# Patient Record
Sex: MALE | Race: WHITE | Employment: OTHER | URBAN - METROPOLITAN AREA
[De-identification: names, ages, dates, MRNs, and addresses within clinical notes are randomized per-mention and may not be internally consistent; named-entity substitution may affect disease eponyms.]

---

## 2017-03-03 ENCOUNTER — OFFICE VISIT (OUTPATIENT)
Dept: AUDIOLOGY | Facility: CLINIC | Age: 67
End: 2017-03-03

## 2017-03-03 DIAGNOSIS — H90.3 SENSORY HEARING LOSS, BILATERAL: Primary | ICD-10-CM

## 2017-09-13 ENCOUNTER — RECORDS - HEALTHEAST (OUTPATIENT)
Dept: LAB | Facility: CLINIC | Age: 67
End: 2017-09-13

## 2017-09-14 LAB
CHOLEST SERPL-MCNC: 161 MG/DL
FASTING STATUS PATIENT QL REPORTED: NORMAL
HDLC SERPL-MCNC: 67 MG/DL
LDLC SERPL CALC-MCNC: 78 MG/DL
PSA SERPL-MCNC: 1.1 NG/ML (ref 0–4.5)
TRIGL SERPL-MCNC: 82 MG/DL

## 2017-11-19 ENCOUNTER — COMMUNICATION - HEALTHEAST (OUTPATIENT)
Dept: SCHEDULING | Facility: CLINIC | Age: 67
End: 2017-11-19

## 2017-11-29 ENCOUNTER — TELEPHONE (OUTPATIENT)
Dept: AUDIOLOGY | Facility: CLINIC | Age: 67
End: 2017-11-29

## 2017-11-30 NOTE — TELEPHONE ENCOUNTER
Tyrone Cohn was seen at the [facility] on 11/29/17 for hearing aid services.  His hearing aids were observed to have wax occlusion in the domes and the domes had detached from their  attachment points.   filters and domes were replaced bilaterally.  The left hearing aid was found to be functioning well but the right device was non-functional.  A new  was placed from clinic stock (in-warranty) and the hearing aid returned to normal function.  A new set of rechargeable size 312 batteries are being ordered for Mr. Cohn as he reports the current ones only provide ~6 hours of usage on a full charge.  The batteries will be billed to his insurance and mailed to his home.  He will return to the clinic as needed.

## 2017-12-06 ENCOUNTER — OFFICE VISIT (OUTPATIENT)
Dept: AUDIOLOGY | Facility: CLINIC | Age: 67
End: 2017-12-06

## 2017-12-06 DIAGNOSIS — H90.3 SENSORY HEARING LOSS, BILATERAL: Primary | ICD-10-CM

## 2018-03-27 ENCOUNTER — RECORDS - HEALTHEAST (OUTPATIENT)
Dept: ADMINISTRATIVE | Facility: OTHER | Age: 68
End: 2018-03-27

## 2018-03-28 ENCOUNTER — HOSPITAL ENCOUNTER (OUTPATIENT)
Dept: RESPIRATORY THERAPY | Facility: CLINIC | Age: 68
Discharge: HOME | End: 2018-03-28

## 2018-03-28 DIAGNOSIS — R06.02 SOB (SHORTNESS OF BREATH): ICD-10-CM

## 2018-03-28 LAB — HGB BLD-MCNC: 15 G/DL (ref 14–18)

## 2018-04-23 ENCOUNTER — RECORDS - HEALTHEAST (OUTPATIENT)
Dept: LAB | Facility: CLINIC | Age: 68
End: 2018-04-23

## 2018-04-23 LAB
ALBUMIN SERPL-MCNC: 3.7 G/DL (ref 3.5–5)
ALP SERPL-CCNC: 74 U/L (ref 45–120)
ALT SERPL W P-5'-P-CCNC: 19 U/L (ref 0–45)
ANION GAP SERPL CALCULATED.3IONS-SCNC: 10 MMOL/L (ref 5–18)
AST SERPL W P-5'-P-CCNC: 23 U/L (ref 0–40)
BILIRUB SERPL-MCNC: 1 MG/DL (ref 0–1)
BUN SERPL-MCNC: 18 MG/DL (ref 8–22)
CALCIUM SERPL-MCNC: 9.2 MG/DL (ref 8.5–10.5)
CHLORIDE BLD-SCNC: 106 MMOL/L (ref 98–107)
CO2 SERPL-SCNC: 24 MMOL/L (ref 22–31)
CREAT SERPL-MCNC: 0.89 MG/DL (ref 0.7–1.3)
GFR SERPL CREATININE-BSD FRML MDRD: >60 ML/MIN/1.73M2
GLUCOSE BLD-MCNC: 82 MG/DL (ref 70–125)
POTASSIUM BLD-SCNC: 4.6 MMOL/L (ref 3.5–5)
PROT SERPL-MCNC: 6.3 G/DL (ref 6–8)
SODIUM SERPL-SCNC: 140 MMOL/L (ref 136–145)

## 2018-06-25 ENCOUNTER — TELEPHONE (OUTPATIENT)
Dept: AUDIOLOGY | Facility: CLINIC | Age: 68
End: 2018-06-25

## 2018-06-25 NOTE — TELEPHONE ENCOUNTER
Health Call Center    Phone Message    May a detailed message be left on voicemail: yes    Reason for Call: Other: Patient is calling about getting batteries maild to him, he stated he called and left a voice message 2 weeks ago with no response?  Please call patient to follow up.      Action Taken: Message routed to:  [facility] AUDIOLOGY

## 2018-06-27 ENCOUNTER — OFFICE VISIT (OUTPATIENT)
Dept: AUDIOLOGY | Facility: CLINIC | Age: 68
End: 2018-06-27

## 2018-06-27 DIAGNOSIS — H90.3 SENSORY HEARING LOSS, BILATERAL: Primary | ICD-10-CM

## 2018-08-27 ENCOUNTER — OFFICE VISIT (OUTPATIENT)
Dept: AUDIOLOGY | Facility: CLINIC | Age: 68
End: 2018-08-27
Payer: OTHER MISCELLANEOUS

## 2018-08-27 DIAGNOSIS — H90.3 SENSORINEURAL HEARING LOSS, BILATERAL: Primary | ICD-10-CM

## 2018-08-27 NOTE — MR AVS SNAPSHOT
After Visit Summary   8/27/2018    Tyrone Cohn    MRN: [id number]           Patient Information     Date Of Birth          1950        Visit Information        Provider Department      8/27/2018 11:00 AM Vivien Francois AuD M [facility] Audiology        Today's Diagnoses     Sensorineural hearing loss, bilateral    -  1       Follow-ups after your visit        Your next 10 appointments already scheduled     Aug 29, 2018 11:00 AM CDT   Programmable Hearing Aid Check with Chloe Talamantes [facility] Audiology ([facility])    [address] [place] [id number] [phone]              Who to contact     Please call your clinic at [phone] to:    Ask questions about your health    Make or cancel appointments    Discuss your medicines    Learn about your test results    Speak to your doctor            Additional Information About Your Visit        Care EveryWhere ID     This is your Care EveryWhere ID. This could be used by other organizations to access your [place] medical records  [id number]         Blood Pressure from Last 3 Encounters:   No data found for BP    Weight from Last 3 Encounters:   No data found for Wt              We Performed the Following     AUDIOGRAM/TYMPANOGRAM - INTERFACE     [facility] Recog ([id number])     Tymps / Reflex   ([id number])        Primary Care Provider    None Specified       No primary provider on file.        Equal Access to Services     [facility] EDEN : Hadii suzanne yates hadasho Soomaali, waaxda luqadaha, qaybta kaalmada adeegyada, waxay betty serna. So [facility] [phone].    ATENCIÓN: Si habla español, tiene a loera disposición servicios gratjewellos de asistencia lingüística. Llame al [phone].    We comply with applicable federal civil rights laws and [place] laws. We do not discriminate on the basis of race, color, national origin, age, disability, sex, sexual  orientation, or gender identity.            Thank you!     Thank you for choosing [facility] AUDIOLOGY  for your care. Our goal is always to provide you with excellent care. Hearing back from our patients is one way we can continue to improve our services. Please take a few minutes to complete the written survey that you may receive in the mail after your visit with us. Thank you!             Your Updated Medication List - Protect others around you: Learn how to safely use, store and throw away your medicines at www.disposemymeds.org.      Notice  As of 8/27/2018  2:16 PM    You have not been prescribed any medications.

## 2018-08-27 NOTE — PROGRESS NOTES
AUDIOLOGY REPORT    SUBJECTIVE:  Tyrone Cohn is a 68 year old male who was seen in the Audiology Clinic at the [facility] for audiologic evaluation. The patient has been seen previously in this clinic on 04/07/2015 for assessment and results indicated normal sloping to severe sensorineural hearing loss bilaterally. The patient reports a slight decline in hearing and bilateral tinnitus. The patient denies otalgia, pressure and dizziness.  The patient was fit with binaural Signia Pure 7bx FER hearing aids on 09/02/2015. He reports that the hearing aids are not holding charge.     OBJECTIVE:  Otoscopic exam indicates ears are clear of cerumen bilaterally     Pure Tone Thresholds assessed using conventional audiometry with good  reliability from 250-8000 Hz bilaterally using circumaural headphones     RIGHT:  Normal sloping to severe sensorineural hearing loss    LEFT:    Normal sloping to severe sensorineural hearing loss    Tympanogram:    RIGHT: normal eardrum mobility    LEFT:   normal eardrum mobility    Reflexes (reported by stimulus ear):  RIGHT: Ipsilateral is present at normal levels  RIGHT: Contralateral is present at normal levels  LEFT:   Ipsilateral is present at normal levels  LEFT:   Contralateral is present at normal levels      Speech Reception Threshold:    RIGHT: 20 dB HL    LEFT:   20 dB HL  Word Recognition Score:     RIGHT: 88% at 60 dB HL using NU-6 recorded word list.    LEFT:   88% at 60 dB HL using NU-6 recorded word list.      ASSESSMENT:   Compared to patient's previous audiogram dated 04/07/2015, hearing has decreased 15 dB from 500-1000 Hz in the right ear, and 10 dB from 2-8 kHz in each ear. Today s results were discussed with the patient in detail. The patient's hearing aids are out of warranty in October 2018. The hearing aids and  are going to be sent in for repair due to the short battery life.    PLAN:  The patient will be called when his  hearing aid and  return from repair. It is recommended that the patient schedule an appointment for programming of his hearing aids.  Please call this clinic with questions regarding these results or recommendations.        Bambi Fowler M.A.  Audiology Doctoral Extern  [place] #[id number]      I was present with the patient for the entire Audiology appointment including all procedures/testing performed by the AuD student, and agree with the student s assessment and plan as documented.      Seamus Thompson  Licensed Audiologist  [place] License #[id number]

## 2018-08-31 ENCOUNTER — MEDICAL CORRESPONDENCE (OUTPATIENT)
Dept: HEALTH INFORMATION MANAGEMENT | Facility: CLINIC | Age: 68
End: 2018-08-31

## 2018-09-18 ENCOUNTER — TELEPHONE (OUTPATIENT)
Dept: AUDIOLOGY | Facility: CLINIC | Age: 68
End: 2018-09-18

## 2018-09-18 NOTE — TELEPHONE ENCOUNTER
[facility] Call Center    Phone Message    May a detailed message be left on voicemail: yes    Reason for Call: Other: Patient would like call to know when he'd be getting his hearing aids back. Patient reports he was to have them expedited and it's been 3 weeks. Please follow-up.      Action Taken: Message routed to:  [facility] soo

## 2018-09-18 NOTE — TELEPHONE ENCOUNTER
Left message for patient that hearing aids are back from repair and available to be picked up from the Audiology Clinic.  Left a message on 9/10/18 with the same information.

## 2018-09-21 ENCOUNTER — RECORDS - HEALTHEAST (OUTPATIENT)
Dept: LAB | Facility: CLINIC | Age: 68
End: 2018-09-21

## 2018-09-21 LAB — HIV 1+2 AB+HIV1 P24 AG SERPL QL IA: NEGATIVE

## 2018-09-22 LAB — T PALLIDUM AB SER QL: NEGATIVE

## 2018-09-24 LAB
C TRACH DNA SPEC QL PROBE+SIG AMP: NEGATIVE
HSV1 IGG SERPL QL IA: POSITIVE
HSV2 IGG SERPL QL IA: NEGATIVE
N GONORRHOEA DNA SPEC QL NAA+PROBE: NEGATIVE

## 2018-10-01 ENCOUNTER — OFFICE VISIT (OUTPATIENT)
Dept: AUDIOLOGY | Facility: CLINIC | Age: 68
End: 2018-10-01
Payer: OTHER MISCELLANEOUS

## 2018-10-01 DIAGNOSIS — H90.3 SENSORINEURAL HEARING LOSS, BILATERAL: Primary | ICD-10-CM

## 2018-10-02 NOTE — PROGRESS NOTES
AUDIOLOGY REPORT    SUBJECTIVE:  Tyrone Cohn is a 68 year old male who was seen in Audiology at the [facility] for a hearing aid check. The patient has been seen previously in this clinic on 8/28/18 for assessment and results indicated normal sloping to severe sensorineural hearing loss bilaterally. The patient was fit with binaural Signia Pure 7bx FER hearing aids on 09/02/2015. The hearing aids were recently sent in for repair and he is here to pick them up and to have the hearing aids turned up.    OBJECTIVE:  Overall gain was increased by 3 dB in both hearing aids which Tyrone reported sounded good. He will try the new settings and if he needs more adjustments he will return to the clinic.    ASSESSMENT:   Repaired hearing aids and  picked up. Overall gain increased by 3.     PLAN:  Tyrone will return for follow-up as needed, or at least every 6-9 months for cleaning and assessment of hearing aid. Please call this clinic with any questions regarding today s appointment.    Seamus Thompson, [facility]  Licensed Audiologist  [place] License #[id number]

## 2018-11-15 ENCOUNTER — RECORDS - HEALTHEAST (OUTPATIENT)
Dept: LAB | Facility: CLINIC | Age: 68
End: 2018-11-15

## 2018-11-15 LAB
CHOLEST SERPL-MCNC: 193 MG/DL
FASTING STATUS PATIENT QL REPORTED: NO
HDLC SERPL-MCNC: 80 MG/DL
LDLC SERPL CALC-MCNC: 103 MG/DL
PSA SERPL-MCNC: 1 NG/ML (ref 0–4.5)
TRIGL SERPL-MCNC: 50 MG/DL

## 2019-04-15 ENCOUNTER — RECORDS - HEALTHEAST (OUTPATIENT)
Dept: ADMINISTRATIVE | Facility: OTHER | Age: 69
End: 2019-04-15

## 2019-05-03 ENCOUNTER — HOSPITAL ENCOUNTER (OUTPATIENT)
Dept: NUCLEAR MEDICINE | Facility: CLINIC | Age: 69
Discharge: HOME | End: 2019-05-03

## 2019-05-03 ENCOUNTER — HOSPITAL ENCOUNTER (OUTPATIENT)
Dept: CARDIOLOGY | Facility: CLINIC | Age: 69
Discharge: HOME | End: 2019-05-03

## 2019-05-03 DIAGNOSIS — R07.9 CHEST PAIN, UNSPECIFIED TYPE: ICD-10-CM

## 2019-05-03 LAB
CV STRESS CURRENT BP HE: NORMAL
CV STRESS CURRENT HR HE: 104
CV STRESS CURRENT HR HE: 107
CV STRESS CURRENT HR HE: 113
CV STRESS CURRENT HR HE: 113
CV STRESS CURRENT HR HE: 117
CV STRESS CURRENT HR HE: 119
CV STRESS CURRENT HR HE: 120
CV STRESS CURRENT HR HE: 122
CV STRESS CURRENT HR HE: 129
CV STRESS CURRENT HR HE: 129
CV STRESS CURRENT HR HE: 133
CV STRESS CURRENT HR HE: 137
CV STRESS CURRENT HR HE: 141
CV STRESS CURRENT HR HE: 148
CV STRESS CURRENT HR HE: 149
CV STRESS CURRENT HR HE: 152
CV STRESS CURRENT HR HE: 155
CV STRESS CURRENT HR HE: 54
CV STRESS CURRENT HR HE: 56
CV STRESS CURRENT HR HE: 60
CV STRESS CURRENT HR HE: 75
CV STRESS CURRENT HR HE: 82
CV STRESS CURRENT HR HE: 83
CV STRESS CURRENT HR HE: 83
CV STRESS CURRENT HR HE: 84
CV STRESS CURRENT HR HE: 84
CV STRESS CURRENT HR HE: 86
CV STRESS CURRENT HR HE: 87
CV STRESS CURRENT HR HE: 89
CV STRESS CURRENT HR HE: 90
CV STRESS CURRENT HR HE: 93
CV STRESS CURRENT HR HE: 95
CV STRESS CURRENT HR HE: 99
CV STRESS CURRENT HR HE: 99
CV STRESS DEVIATION TIME HE: NORMAL
CV STRESS ECHO PERCENT HR HE: NORMAL
CV STRESS EXERCISE STAGE HE: NORMAL
CV STRESS EXERCISE STAGE REACHED HE: NORMAL
CV STRESS FINAL RESTING BP HE: NORMAL
CV STRESS FINAL RESTING HR HE: 84
CV STRESS MAX HR HE: 156
CV STRESS MAX TREADMILL GRADE HE: 16
CV STRESS MAX TREADMILL SPEED HE: 4.2
CV STRESS PEAK DIA BP HE: NORMAL
CV STRESS PEAK SYS BP HE: NORMAL
CV STRESS PHASE HE: NORMAL
CV STRESS PROTOCOL HE: NORMAL
CV STRESS RESTING PT POSITION HE: NORMAL
CV STRESS ST DEVIATION AMOUNT HE: NORMAL
CV STRESS ST DEVIATION ELEVATION HE: NORMAL
CV STRESS ST EVELATION AMOUNT HE: NORMAL
CV STRESS TEST TYPE HE: NORMAL
CV STRESS TOTAL STAGE TIME MIN 1 HE: NORMAL
NUC STRESS EJECTION FRACTION: 66 %
STRESS ECHO BASELINE BP: NORMAL
STRESS ECHO BASELINE HR: 56
STRESS ECHO CALCULATED PERCENT HR: 102 %
STRESS ECHO LAST STRESS BP: NORMAL
STRESS ECHO LAST STRESS HR: 155
STRESS ECHO POST ESTIMATED WORKLOAD: 12.1
STRESS ECHO POST EXERCISE DUR MIN: 10
STRESS ECHO POST EXERCISE DUR SEC: 20
STRESS ECHO TARGET HR: 130

## 2019-07-17 ENCOUNTER — RECORDS - HEALTHEAST (OUTPATIENT)
Dept: LAB | Facility: CLINIC | Age: 69
End: 2019-07-17

## 2019-07-17 LAB — C REACTIVE PROTEIN LHE: 0.1 MG/DL (ref 0–0.8)

## 2019-07-18 LAB
ANA SER QL: 0.2 U
B BURGDOR IGG+IGM SER QL: 2.39 INDEX VALUE
CCP AB SER IA-ACNC: <0.5 U/ML

## 2019-07-19 LAB
B BURGDOR AB SER-IMP: NORMAL
LYME AB IGG BAND(S): NORMAL
LYME AB IGM BAND(S): NORMAL
LYME IGG BLOT: NEGATIVE
LYME IGM BLOT: NEGATIVE

## 2019-08-12 ENCOUNTER — OFFICE VISIT (OUTPATIENT)
Dept: AUDIOLOGY | Facility: CLINIC | Age: 69
End: 2019-08-12
Payer: OTHER MISCELLANEOUS

## 2019-08-12 DIAGNOSIS — H90.3 SENSORY HEARING LOSS, BILATERAL: Primary | ICD-10-CM

## 2019-08-29 ENCOUNTER — TELEPHONE (OUTPATIENT)
Dept: AUDIOLOGY | Facility: CLINIC | Age: 69
End: 2019-08-29

## 2019-08-29 NOTE — TELEPHONE ENCOUNTER
Left a message for the patient stating the he could use over-the-ear headphones that are large enough to fit over the  of the hearing aid in his ear and the body of the hearing aid that sits on top of his ear. If he is using earbuds, it will work best to just take the hearing aids out.    Storm Leos, [facility]  Licensed Audiologist  [place] #[id number]      [facility] Call Center    Phone Message    May a detailed message be left on voicemail: yes    Reason for Call: Other: Patient is calling in and has some questions about headsets that work with his hearing aids. 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 Please follow up with the patient asap. Thank you.     Action Taken: Message routed to:  [facility] audiology

## 2019-11-26 ENCOUNTER — RECORDS - HEALTHEAST (OUTPATIENT)
Dept: LAB | Facility: CLINIC | Age: 69
End: 2019-11-26

## 2019-11-27 LAB
ANION GAP SERPL CALCULATED.3IONS-SCNC: 10 MMOL/L (ref 5–18)
BUN SERPL-MCNC: 21 MG/DL (ref 8–22)
CALCIUM SERPL-MCNC: 9.4 MG/DL (ref 8.5–10.5)
CHLORIDE BLD-SCNC: 105 MMOL/L (ref 98–107)
CHOLEST SERPL-MCNC: 200 MG/DL
CO2 SERPL-SCNC: 26 MMOL/L (ref 22–31)
CREAT SERPL-MCNC: 0.95 MG/DL (ref 0.7–1.3)
FASTING STATUS PATIENT QL REPORTED: ABNORMAL
GFR SERPL CREATININE-BSD FRML MDRD: >60 ML/MIN/1.73M2
GLUCOSE BLD-MCNC: 85 MG/DL (ref 70–125)
HDLC SERPL-MCNC: 72 MG/DL
LDLC SERPL CALC-MCNC: 104 MG/DL
POTASSIUM BLD-SCNC: 4.3 MMOL/L (ref 3.5–5)
PSA SERPL-MCNC: 1.1 NG/ML (ref 0–4.5)
SODIUM SERPL-SCNC: 141 MMOL/L (ref 136–145)
TRIGL SERPL-MCNC: 120 MG/DL

## 2020-02-05 ENCOUNTER — OFFICE VISIT (OUTPATIENT)
Dept: AUDIOLOGY | Facility: CLINIC | Age: 70
End: 2020-02-05
Payer: OTHER MISCELLANEOUS

## 2020-02-05 DIAGNOSIS — H90.3 SENSORINEURAL HEARING LOSS, BILATERAL: Primary | ICD-10-CM

## 2020-02-05 NOTE — PROGRESS NOTES
AUDIOLOGY REPORT    SUBJECTIVE: Tyrone Cohn is a 70 year old male who was seen in the Audiology Clinic at [facility] for audiologic evaluation and hearing aid check. The patient has been seen previously in this clinic on 8/27/2018 for assessment, and results indicated normal hearing sloping to moderately severe sensorineural hearing loss for the right ear and normal hearing sloping to severe sensorineural hearing loss for the left ear. He reports a possible decrease in hearing for both ears. He notes increased tinnitus bilaterally. He denies pain, drainage, dizziness, or a history of ear surgeries.  He was fit with bilateral Siemens Pure 7bx hearing aids on 9/16/2015. He notes the rechargeable batteries have not been holding a charge well.    OBJECTIVE:  Abuse Screening:  Do you feel unsafe at home or work/school? No  Do you feel threatened by someone? No  Does anyone try to keep you from having contact with others, or doing things outside of your home? No  Physical signs of abuse present? No     Fall Risk Screening:  Have you fallen two or more times in the past year? No  Have you fallen and had an injury in the past year? No    Otoscopic exam indicated ears are clear of cerumen bilaterally     Pure Tone Thresholds assessed using conventional audiometry with good reliability from 250-8000 Hz bilaterally using insert earphones and circumaural headphones     RIGHT:  Normal hearing sloping to moderately severe sensorineural hearing loss    LEFT:    Normal hearing sloping to severe sensorineural hearing loss    Tympanogram:    RIGHT: Normal eardrum mobility    LEFT:   Normal eardrum mobility    Reflexes (reported by stimulus ear):    RIGHT: Ipsilateral is present at normal levels    RIGHT: Contralateral is present at normal levels    LEFT:   Ipsilateral is present at normal levels    LEFT:   Contralateral is present at normal levels    Speech Reception Threshold:     RIGHT: 20 dB HL    LEFT:   20 dB HL    Word Recognition Score:     RIGHT: 100% at 80 dB HL using NU-6 recorded word list    LEFT:   100% at 80 dB HL using NU-6 recorded word list    Both hearing aids were deep cleaned. A listening check revealed the right hearing aid sounds weak and the left hearing aid sounds crisp and clear. An electroacoustic analysis was performed and showed the right hearing aid is weak and has high distortion and the left hearing aid is functioning up to specifications. It was recommended that the right hearing aid be sent in for an out-of-warranty repair. Explained that we could get new rechargeable batteries for both hearing aids as well. The patient stated that he is interested in pursuing new hearing aids through his insurance. He is worried that his insurance will not cover new hearing aids if the repair is done/he gets new rechargeable batteries.     ASSESSMENT: Bilateral sensorineural hearing loss. Compared to the patient's previous audiogram dated 8/27/2018, hearing has remained stable for both ears. Today s results were discussed with the patient in detail.     A hearing aid check was completed. The right hearing aid is weak and has high distortion.      PLAN: Tyrone will return for a hearing aid consultation to discuss new technology. He will let the clinic know if he would like to send the right hearing aid in for repair and get new rechargeable batteries. Please call with questions regarding these results or recommendations.      Storm Leos, [facility]  Licensed Audiologist  [place] #[id number]

## 2020-02-07 ENCOUNTER — OFFICE VISIT (OUTPATIENT)
Dept: AUDIOLOGY | Facility: CLINIC | Age: 70
End: 2020-02-07
Payer: OTHER MISCELLANEOUS

## 2020-02-07 DIAGNOSIS — H90.3 SENSORINEURAL HEARING LOSS, BILATERAL: Primary | ICD-10-CM

## 2020-02-07 NOTE — PROGRESS NOTES
AUDIOLOGY REPORT    SUBJECTIVE:   Tyrone Cohn is a 70 year old male was seen in Audiology at the [facility] on 2/07/20 to discuss concerns with hearing and functional communication difficulties. Tyrone has been seen previously on 2/5/2020, and results revealed a normal sloping to moderately-severe sensorineural hearing loss in the right ear and a normal sloping to profound sensorineural hearing loss in the left.  Tyrone notes difficulty with communication in a variety of listening situations. He is a long term hearing aid user and was last fit with Siemens Pure 7bx hearing aids 9/16/15.    OBJECTIVE:  Patient is a hearing aid candidate. Patient would like to move forward with a hearing aid evaluation today. Therefore, the patient was presented with different options for amplification to help aid in communication. Discussed styles, levels of technology and monaural vs. binaural fitting.     The hearing aid(s) mutually chosen were:  Binaural: Signia Pure 7Nx Charge & Go  COLOR: Dark champagne  BATTERY SIZE: N/A  EARMOLD/TIPS: 8mm open  CANAL/ LENGTH: 2 standard    Patient would also like to order a Streamline ramy    ASSESSMENT:     Reviewed purchase information and warranty information with patient. The 45 day trial period was explained to patient. The patient was given a copy of the [place] Department of Health consumer brochure on purchasing hearing instruments. Patient risk factors have been provided to the patient in writing prior to the sale of the hearing aid per FDA regulation. The risk factors are also available in the User Instructional Booklet to be presented on the day of the hearing aid fitting. Hearing aid(s) ordered. Hearing aid evaluation completed.    PLAN:   Tyrone is scheduled to return in 2-3 weeks for a hearing aid fitting and programming. Purchase agreement will be completed on that date. Please contact this clinic with any questions or  concerns.      Seamus Thompson, [facility]  Licensed Audiologist  [place] License #[id number]

## 2020-03-10 ENCOUNTER — OFFICE VISIT (OUTPATIENT)
Dept: AUDIOLOGY | Facility: CLINIC | Age: 70
End: 2020-03-10
Payer: OTHER MISCELLANEOUS

## 2020-03-10 DIAGNOSIS — H90.3 SENSORINEURAL HEARING LOSS, BILATERAL: Primary | ICD-10-CM

## 2020-03-10 NOTE — PROGRESS NOTES
"AUDIOLOGY REPORT    SUBJECTIVE: Tyrone Cohn is a 70 year old male who was seen in the Audiology Clinic at the [facility] for a fitting of binaural Signia Pure 7Nx Charge and Go  in-the-ear hearing aids. Previous results have revealed a normal sloping to moderately-severe sensorineural hearing loss in the right ear and a normal sloping to profound sensorineural hearing loss in the left.     OBJECTIVE: The hearing aid conformity evaluation was completed.The hearing aids were placed and they provided a good fit. Real-ear-probe-microphone measurements were completed on the iSkoot system and were a good match to NAL-NL1 target with soft sounds audible, moderate sounds comfortable, and loud sounds below discomfort. UCLs are verified through maximum power output measures and demonstrate appropriate limiting of loud inputs. Tyrone was oriented to proper hearing aid use, care, cleaning (no water, dry brush), batteries (size rechargeable, insertion/removal, toxicity, low-battery signal), aid insertion/removal, user booklet, warranty information, storage cases, and other hearing aid details. The patient confirmed understanding of hearing aid use and care, and showed proper insertion of hearing aid and batteries while in the office today.Tyrone reported good volume and sound quality today. He reports a static like noise in his left ear with \"sh\". A more closed dome was tried in the left ear, however patient reported he felt more plugged up. The dome was switched back to an open dome and gain was decreased on the left using the fitting assistant. Patient reported the static went away following these changes.     Hearing aids were programmed as follows:  Program 1:Universal   At this time, patient would like to keep hearing aids automatic with just the Universal program, but volume control was activated (Right to raise the volume in both and left to lower the volume in both).    EAR(S) FIT: " Bilateral  HEARING AID MODEL NAME: Noemy Pure 7 Nx Charge and Go  HEARING AID STYLE: -in-the-ear behind-the-ear  EARMOLDS/TIP: RITE Size 2 with 8mm open dome  SERIAL NUMBERS: Right: [id number] Left: [id number]  WARRANTY END DATE: 3/7/2023    Patient was shown how to use StreamLine Benigno accessory as both a remote and a remote microphone. Tyrone reports that he will soon be getting a new Android phone. Patient was given instructions on how to pair Android phone to hearing aids when he receives new phone or can come in if further help is needed.     ASSESSMENT: Binaual -in-the-ear behind-the-ear hearing aid(s) were fit today. Verification measures were performed. Tyrone signed the Hearing Aid Purchase Agreement and was given a copy, as well as details on his hearing aids. Patient was counseled that exact out of pocket amounts cannot be determined for hearing aid claims being sent to insurance. Any insurance coverage information presented to the patient is an estimate only, and is not a guarantee of payment. Patient has been advised to check with their own insurance.    PLAN:Tyrone will return for follow-up in 2-3 weeks for a hearing aid review appointment. Please call this clinic with questions regarding today s appointment.    TUNDE LiceaS.  Audiology Doctoral Extern  License #[id number]    I was present with the patient for the entire Audiology appointment including all procedures/testing performed by the AuD student, and agree with the student s assessment and plan as documented.      Storm Mo, [facility]  Licensed Audiologist  [place] #[id number]

## 2020-03-10 NOTE — PROCEDURES
AUDIOLOGY REPORT    SUBJECTIVE: Tyrone Cohn is a 70 year old male who was seen in the Audiology Clinic at the [facility] for a fitting of binaural Signia Pure 7Nx Charge and Go  in-the-ear hearing aids. Previous results have revealed a normal sloping to moderately-severe sensorineural hearing loss in the right ear and a normal sloping to profound sensorineural hearing loss in the left.     OBJECTIVE: The hearing aid conformity evaluation was completed.The hearing aids were placed and they provided a good fit. Real-ear-probe-microphone measurements were completed on the [employer] system and were a good match to NAL-NL1 target with soft sounds audible, moderate sounds comfortable, and loud sounds below discomfort. UCLs are verified through maximum power output measures and demonstrate appropriate limiting of loud inputs. Tyrone was oriented to proper hearing aid use, care, cleaning (no water, dry brush), batteries (size rechargeable, insertion/removal, toxicity, low-battery signal), aid insertion/removal, user booklet, warranty information, storage cases, and other hearing aid details. The patient confirmed understanding of hearing aid use and care, and showed proper insertion of hearing aid and batteries while in the office today.Tyrone reported good volume and sound quality today.   Hearing aids were programmed as follows:  Program 1:***    EAR(S) FIT: Bilateral  HEARING AID MODEL NAME: Signia Pure 7 Nx Charge and Go  HEARING AID STYLE: -in-the-ear behind-the-ear  EARMOLDS/TIP: RITE Size 2 with 8mm open dome  SERIAL NUMBERS: Right: *** Left:: ***  WARRANTY END DATE: ***    ASSESSMENT: Binaual -in-the-ear behind-the-ear hearing aid(s) were fit today. Verification measures were performed. Tyrone signed the Hearing Aid Purchase Agreement and was given a copy, as well as details on his hearing aids. Patient was counseled that exact out of pocket amounts cannot be  determined for hearing aid claims being sent to insurance. Any insurance coverage information presented to the patient is an estimate only, and is not a guarantee of payment. Patient has been advised to check with their own insurance.    PLAN:Tyrone will return for follow-up in 2-3 weeks for a hearing aid review appointment. Please call this clinic with questions regarding today s appointment.    ***

## 2020-03-16 ENCOUNTER — TELEPHONE (OUTPATIENT)
Dept: AUDIOLOGY | Facility: CLINIC | Age: 70
End: 2020-03-16

## 2020-03-16 NOTE — TELEPHONE ENCOUNTER
Tyrone Cohn was seen at the [facility] on 3/16/20 for hearing aid services.  He reported his hearing aids that were fit on 3/10/20 kept popping out of his ears.  Dome size was changed today to 8mm Open as those were used on his previous hearing aids.  Mr. Cohn reported better retention and he was advised to schedule a hearing aid check to have his audiologist investigate the fit issue further.

## 2020-03-19 ENCOUNTER — TELEPHONE (OUTPATIENT)
Dept: AUDIOLOGY | Facility: CLINIC | Age: 70
End: 2020-03-19

## 2020-03-19 NOTE — TELEPHONE ENCOUNTER
Called patient  encouraging them to reschedule due to concerns regarding COVID 19 after 04/12/2020. Gave call center information. However he does have concerns regarding the fit of his new aids. Told him trial period is likely extended past the 45 days however is welcome to return them for credit and we can start over once the COVID-19 restrictions are lifted if her preserves. He will call tomorrow and let me know.       Seamus Mo., [facility]  Licensed Audiologist  [place] #[id number]

## 2020-03-26 ENCOUNTER — DOCUMENTATION ONLY (OUTPATIENT)
Dept: AUDIOLOGY | Facility: CLINIC | Age: 70
End: 2020-03-26

## 2020-03-26 NOTE — PROGRESS NOTES
SUBJECTIVE:  Tyrone Cohn comes in today  to return the hearing aid(s) for credit. Patient wears Signia Pure 7Nx Charge and Go hearing aids.     OBJECTIVE: Patient's 45 day trial period is going to  soon and he is having trouble with dome fit and feedback. Due to COVID-19 he cannot come in for trouble shooting so was advised to return the hearing aids and we can start over when the restrictions are lifted. Per Maria Elena Simpson, $250.00 non refundable fee will be waived in this instance.    ASSESSMENT: Patient is within their 45 day trial period and can return the hearing aids for credit. Patient (or insurance company) will be refunded entire amount of aids as outlined in the purchase agreement.     PLAN:  Return in 1-2 years for annual audiograms and for refitting when able.      Storm Mo, [facility]  Licensed Audiologist  [place] #[id number]

## 2020-06-04 ENCOUNTER — TELEPHONE (OUTPATIENT)
Dept: AUDIOLOGY | Facility: CLINIC | Age: 70
End: 2020-06-04

## 2020-06-04 NOTE — TELEPHONE ENCOUNTER
Called and talked to patient, inquired if he would like the same devices ordered or if he would like an appointment for consultation of new devices. Patient would like to consider alternative options. Patient given scheduling phone number to schedule hearing aid consultation with Cassidy Goff.    Seamus Soler.  Licensed Audiologist  [place] #[id number]      [facility]    Phone Message    May a detailed message be left on voicemail: yes     Reason for Call: Other:      Pt is calling in asking to speak to Remedios.  He is looking to discuss next steps to get his replacement hearing aids ordered.   Please call back to discuss.         Action Taken: Message routed to:  [facility] Audiology    Travel Screening: Not Applicable

## 2020-07-14 ENCOUNTER — TELEPHONE (OUTPATIENT)
Dept: AUDIOLOGY | Facility: CLINIC | Age: 70
End: 2020-07-14

## 2020-07-14 ENCOUNTER — RECORDS - HEALTHEAST (OUTPATIENT)
Dept: LAB | Facility: CLINIC | Age: 70
End: 2020-07-14

## 2020-07-14 LAB
CHOLEST SERPL-MCNC: 190 MG/DL
FASTING STATUS PATIENT QL REPORTED: ABNORMAL
HDLC SERPL-MCNC: 71 MG/DL
LDLC SERPL CALC-MCNC: 74 MG/DL
TRIGL SERPL-MCNC: 223 MG/DL

## 2020-07-14 NOTE — TELEPHONE ENCOUNTER
Called pt and LVM.    Informed him that 7/15 appointment with Cassidy Pearlmpton will need to be IN PERSON, not telephone (original appointment was scheduled in error).    Provied AuD call center number for pt to call back if questions arise, or if he is unable to make it in tomorrow. Patient is welcome to reschedule appointment as IN CLINIC.

## 2020-07-15 ENCOUNTER — OFFICE VISIT (OUTPATIENT)
Dept: AUDIOLOGY | Facility: CLINIC | Age: 70
End: 2020-07-15
Payer: OTHER MISCELLANEOUS

## 2020-07-15 DIAGNOSIS — H90.3 SENSORINEURAL HEARING LOSS, BILATERAL: Primary | ICD-10-CM

## 2020-07-15 NOTE — PROGRESS NOTES
AUDIOLOGY REPORT    SUBJECTIVE: Tyrone Cohn is a 70 year old male was seen in the Audiology Clinic at  [facility] on 7/15/20 to discuss concerns with hearing and functional communication difficulties.  Tyrone has been seen previously on 2/5/2020, and results revealed a normal sloping to moderately-severe sensorineural hearing loss in the right ear and a normal sloping to profound sensorineural hearing loss in the left. He is a long term hearing aid user and was last fit with [employer] Pure Charge & GO 7 on March 10, 2020. Due to Covid-19, he was unable to come in to get adjustments made, so we encouraged him to return the aids until our office was reopened. He did that and now returns to start the proceed of getting new aids again.    OBJECTIVE:  Patient is a hearing aid candidate. Patient would like to move forward with a hearing aid evaluation today. Therefore, the patient was presented with different options for amplification to help aid in communication. Discussed styles, levels of technology and monaural vs. binaural fitting. Due to feedback being a problem with his last set, I think trying a custom earmold is needed. We also talked about switching to try Phonak Marvels as he reports he is switching to a Android phone and is interested in blue tooth technology. He would like the remote ramy if possible.     The hearing aid(s) mutually chosen were:  Binaural: Phonak Audeo M-90  COLOR: P5  BATTERY SIZE: Rechargeable  EARMOLD/TIPS: sleeve mold  CANAL/ LENGTH: 2    Remote ramy will also be ordered    Otoscopy revealed ears are clear of cerumen bilaterally. Bilateral earmolds were taken without incident.    ASSESSMENT:   No diagnosis found.    Reviewed purchase information and warranty information with patient. The 45 day trial period was explained to patient. The patient was given a copy of the [place] Department of Health consumer brochure on purchasing hearing instruments.  Patient risk factors have been provided to the patient in writing prior to the sale of the hearing aid per FDA regulation. The risk factors are also available in the User Instructional Booklet to be presented on the day of the hearing aid fitting. Hearing aid(s) ordered. Hearing aid evaluation completed.    PLAN: Tyrone is scheduled to return in 2-3 weeks for a hearing aid fitting and programming. Purchase agreement will be completed on that date. Please contact this clinic with any questions or concerns.          Storm Mo, [facility]  Licensed Audiologist  [place] #[id number]

## 2020-08-19 ENCOUNTER — OFFICE VISIT (OUTPATIENT)
Dept: AUDIOLOGY | Facility: CLINIC | Age: 70
End: 2020-08-19
Payer: OTHER MISCELLANEOUS

## 2020-08-19 DIAGNOSIS — H90.3 SENSORINEURAL HEARING LOSS, BILATERAL: Primary | ICD-10-CM

## 2020-08-19 NOTE — PROGRESS NOTES
AUDIOLOGY REPORT    SUBJECTIVE: Tyrone Cohn is a 70 year old male who was seen in the Audiology Clinic at the [facility] for a fitting of Phonak Audeo M90-R behind-the-ear hearing aid(s).  Tyrone has been seen previously on 2/5/2020, and results revealed a normal sloping to moderately-severe sensorineural hearing loss in the right ear and a normal sloping to profound sensorineural hearing loss in the left. He is a long term hearing aid user and was last fit with [employer] Pure Charge & GO 7 on March 10, 2020. Due to Covid-19, he was unable to come in to get adjustments made, so we encouraged him to return the aids until our office was reopened. He did that and now returns to be fit with new hearing aids.     OBJECTIVE: The hearing aid conformity evaluation was completed.The hearing aids were placed and they provided a good fit. Real-ear-probe-microphone measurements were completed on the "[employer] system and were a good match to NAL-NL1 target with soft sounds audible, moderate sounds comfortable, and loud sounds below discomfort. UCLs are verified through maximum power output measures and demonstrate appropriate limiting of loud inputs. Tyrone was oriented to proper hearing aid use, care, cleaning (no water, dry brush), batteries (size rechargeable, insertion/removal, toxicity, low-battery signal), aid insertion/removal, user booklet, warranty information, storage cases, and other hearing aid details. The patient confirmed understanding of hearing aid use and care, and showed proper insertion of hearing aid and batteries while in the office today.Tyrone reported good volume and sound quality today.   Hearing aids were programmed as follows:  Program 1:Auto only  Partner ramy is also fit today. Patient is waiting to buy a new Android phone and will connect this hearing aids at that time.  EAR(S) FIT: Bilateral  HEARING AID MODEL NAME: Phonak Audeo M90-R  HEARING AID STYLE: -in-the-ear  behind-the-ear  EARMOLDS/TIP: custom sleeve mold  SERIAL NUMBERS: Right: [id number] Left: [id number]  WARRANTY END DATE: 10/20/23     ASSESSMENT: Phonak Audeo M90-R hearing aid(s) were fit today. Verification measures were performed. Tyrone signed the Hearing Aid Purchase Agreement and was given a copy, as well as details on his hearing aids. Patient was counseled that exact out of pocket amounts cannot be determined for hearing aid claims being sent to insurance. Any insurance coverage information presented to the patient is an estimate only, and is not a guarantee of payment. Patient has been advised to check with their own insurance.    PLAN:Tyrone will return for follow-up in 2-3 weeks for a hearing aid review appointment. Please call this clinic with questions regarding today s appointment.      Storm Mo, [facility]-A  Licensed Audiologist  [place] #[id number]

## 2020-08-24 ENCOUNTER — TELEPHONE (OUTPATIENT)
Dept: AUDIOLOGY | Facility: CLINIC | Age: 70
End: 2020-08-24

## 2020-08-24 NOTE — TELEPHONE ENCOUNTER
[facility] Call Center    Phone Message    May a detailed message be left on voicemail: yes     Reason for Call: Other: Pt calling said has a lot of back ground noises and when chews has a ton of noise can't hear at all    Please give Pt a call to discuss what he needs to do  Thank you,    Action Taken: Message routed to:  [facility] Audiology    Travel Screening: Not Applicable

## 2020-08-25 NOTE — TELEPHONE ENCOUNTER
Called patient and LVM.    Informed him that, per Vivien Francois, patient should schedule HCP with Chloe Seth. Provided [facility] call center number for pt to call back and schedule.

## 2020-08-27 NOTE — PROGRESS NOTES
AUDIOLOGY REPORT    SUBJECTIVE:Tyrone Cohn is a 70 year old male who was seen in the Audiology Clinic at the [facility] on 8/28/2020  for a follow-up check regarding the fitting of new hearing aids. Tyrone has been seen previously on 2/5/2020, and results revealed a normal sloping to moderately-severe sensorineural hearing loss in the right ear and a normal sloping to profound sensorineural hearing loss in the left. The patient has been seen previously in this clinic and was fit with Phonak Audeo M90-R on 08/19/2020.  Tyrone reports that with the custom sleeve molds he cannot stand the sound of the hearing aids. He hears his sandra movements, his eating noise, and his voice too much. He in fact stopped wearing the hearing aids and started wearing the old pair as he could no longer stand the noise.    OBJECTIVE:   Based on patient report, the following changes were made;Decreased low frequency gain and increased occlusion manager to strong. It is still very bothersome to him. He will not wear them like this.Therefore removed sleeve domes and changed to tulip domes. Reran feedback manager. No feedback detected. Explained due to his hearing loss  He may not be getting as much volume in the high tones as we would like him to get. He understands this and would still prefer the domes. We will have him try the domes for a few weeks and see how he is communicating.     Reviewed 45 day trial period, care, cleaning (no water, dry brush), batteries (size rechargeable) insertion/removal, toxicity, low-battery signal), aid insertion/removal, volume adjustment (if applicable), user booklet, warranty information, storage cases, and other hearing aid details.       No charge visit today (in warranty hearing aid check).     ASSESSMENT: A follow-up appointment for hearing aid fitting was completed today.  Changes to hearing aid was completed as outlined above.     PLAN:Tyrone will return for follow-up in  2-3 weeks to check on progress. Please call this clinic with any questions regarding today s appointment.        Storm Mo, [facility]  Licensed Audiologist  [place] #[id number]

## 2020-08-28 ENCOUNTER — OFFICE VISIT (OUTPATIENT)
Dept: AUDIOLOGY | Facility: CLINIC | Age: 70
End: 2020-08-28
Payer: OTHER MISCELLANEOUS

## 2020-08-28 DIAGNOSIS — H90.3 SENSORINEURAL HEARING LOSS, BILATERAL: Primary | ICD-10-CM

## 2020-09-09 ENCOUNTER — OFFICE VISIT (OUTPATIENT)
Dept: AUDIOLOGY | Facility: CLINIC | Age: 70
End: 2020-09-09
Payer: OTHER MISCELLANEOUS

## 2020-09-09 DIAGNOSIS — H90.3 SENSORINEURAL HEARING LOSS, BILATERAL: Primary | ICD-10-CM

## 2020-09-09 NOTE — PROGRESS NOTES
"AUDIOLOGY REPORT    SUBJECTIVE:Tyrone Cohn is a 70 year old male who was seen in the Audiology Clinic at the [facility] on 9/9/2020  for a follow-up check regarding the fitting of new hearing aids. Tyrone has been seen previously on 2/5/2020, and results revealed a normal sloping to moderately-severe sensorineural hearing loss in the right ear and a normal sloping to profound sensorineural hearing loss in the left. The patient has been seen previously in this clinic and was fit with Phonak Audeo M90-R on 08/19/2020.  Tyrone reports that his  is not working. He will put the hearing aids in at night, see the green blinking lights, but upon opening the  in the morning there are no lights and the hearing aids are dead. He also reports that when driving with the windows down the right hearing aid would have e aloud low level statis sound. He can also hear the \"scratch test\" better on the right ear, and hardly at all on the left ear.    OBJECTIVE:   Based on patient report, the following changes were made;Increased TK and low frequency gain in the left ear so that he can better hear the \"scratch test\". He feels it is better then it was after experienceing with several different changes. A new  is also ordered for patient and this will be shipped to him directly. I added a \"comfort in noise \" program for him to try if the wind or background noise is too much, and how to change programs were reviewed and a printed instruction given.     Reviewed partner ramy use and care and encouraged Tyrone to try this out,      No charge visit today (in warranty hearing aid check).     ASSESSMENT: A follow-up appointment for hearing aid fitting was completed today.  Changes to hearing aid was completed as outlined above.     PLAN:Tyrone will return again for follow-up in 2-3 weeks to check on progress. Please call this clinic with any questions regarding today S appointment.        Cassidy" Storm Osborn, [facility]-A  Licensed Audiologist  [place] #[id number]

## 2020-09-10 ENCOUNTER — TELEPHONE (OUTPATIENT)
Dept: AUDIOLOGY | Facility: CLINIC | Age: 70
End: 2020-09-10

## 2020-09-10 NOTE — TELEPHONE ENCOUNTER
"Called patient back and talked it through. Hearing aids acting \"funny\". Sandra keeps going on and hearing aids keep turning on and off. As he report his  was not working correctly I think it may have something to do with that. We have ordered a new  and it should arrive at patients house in the next few days. I suggest he keeps them in a Dri aid kit until then, and then use the new  and see if the intermittent issues go sway. If they do not he will contact me.        Storm Mo, [facility]-A  Licensed Audiologist  [place] #[id number]    "

## 2020-09-10 NOTE — TELEPHONE ENCOUNTER
[facility] Call Center    Phone Message    May a detailed message be left on voicemail: yes     Reason for Call: Other:   Pt states that his hearing aid is giving him trouble this morning. Pt would like a call back from care team to advise on how to proceed.     Action Taken: Other:  AUD    Travel Screening: Not Applicable

## 2020-09-30 ENCOUNTER — OFFICE VISIT (OUTPATIENT)
Dept: AUDIOLOGY | Facility: CLINIC | Age: 70
End: 2020-09-30
Payer: OTHER MISCELLANEOUS

## 2020-09-30 DIAGNOSIS — H90.3 SENSORINEURAL HEARING LOSS, BILATERAL: Primary | ICD-10-CM

## 2020-09-30 NOTE — PROGRESS NOTES
AUDIOLOGY REPORT    SUBJECTIVE:Tyrone Cohn is a 70 year old male who was seen in the Audiology Clinic at the [facility] on 9/30/2020  for a follow-up check regarding the fitting of new hearing aids. Tyrone has been seen previously on 2/5/2020, and results revealed a normal sloping to moderately-severe sensorineural hearing loss in the right ear and a normal sloping to profound sensorineural hearing loss in the left. The patient has been seen previously in this clinic and was fit with Phonak Audeo M90-R on 08/19/2020.  Tyrone reports that his  is not working. He will put the hearing aids in at night, see the green blinking lights, but upon opening the  in the morning there are no lights and the hearing aids are dead.     OBJECTIVE:   Briana was called and details regarding the charging of hearing aid was asked. Specifically if  is unplugged and then replugged in during a charge, will the hearing aids fully charge? Briana feels this should happen but subjectively Tyrone says it has not, therefore new  will be sent directly to patients home. Tyrone also request a battery pack  so this was also ordered for patient and this will be shipped to him directly.      No charge visit today (in warranty hearing aid check).     ASSESSMENT: A follow-up appointment for hearing aid fitting was completed today.  Changes to hearing aid was completed as outlined above.     PLAN: Tyrone will return again for follow-up within one year to check on progress. Tyrone was asked to call us to let us know if the new  arrived.Please call this clinic with any questions regarding today s appointment.        Storm Mo, [facility]-A  Licensed Audiologist  [place] #[id number]

## 2020-10-06 NOTE — TELEPHONE ENCOUNTER
Provider Cassidy Goff out of office for an extended period of time. Called patient back (left voicemail) asking if he received new  and asking him to call back and leave a message for Macy Love with details if he is still having issues with the new .    Seamus Soler.  Licensed Audiologist  [place] #[id number]      [facility] Call Center    Phone Message    May a detailed message be left on voicemail: yes     Reason for Call: Other: Pt called requesting a message be sent over to Dr. Goff regarding some information he has on his hearing aid Charging cycles that she requested. Pt stated he would like a call back at [phone]. Thank you!     Action Taken: Message routed to:  [facility] AUDIOLOGY    Travel Screening: Not Applicable

## 2020-11-13 ENCOUNTER — RECORDS - HEALTHEAST (OUTPATIENT)
Dept: LAB | Facility: CLINIC | Age: 70
End: 2020-11-13

## 2020-11-13 LAB
C REACTIVE PROTEIN LHE: <0.1 MG/DL (ref 0–0.8)
RHEUMATOID FACT SERPL-ACNC: <15 IU/ML (ref 0–30)

## 2020-12-30 ENCOUNTER — OFFICE VISIT (OUTPATIENT)
Dept: AUDIOLOGY | Facility: CLINIC | Age: 70
End: 2020-12-30
Payer: OTHER MISCELLANEOUS

## 2020-12-30 DIAGNOSIS — H90.3 SENSORINEURAL HEARING LOSS, BILATERAL: Primary | ICD-10-CM

## 2020-12-30 PROCEDURE — V5299 HEARING SERVICE: HCPCS | Performed by: AUDIOLOGIST

## 2021-01-15 ENCOUNTER — HEALTH MAINTENANCE LETTER (OUTPATIENT)
Age: 71
End: 2021-01-15

## 2021-06-04 ENCOUNTER — OFFICE VISIT (OUTPATIENT)
Dept: AUDIOLOGY | Facility: CLINIC | Age: 71
End: 2021-06-04
Payer: OTHER MISCELLANEOUS

## 2021-06-04 DIAGNOSIS — H90.3 SENSORINEURAL HEARING LOSS, BILATERAL: Primary | ICD-10-CM

## 2021-06-04 PROCEDURE — 99207 PR ASSESSMENT FOR HEARING AID: CPT | Performed by: AUDIOLOGIST

## 2021-06-04 NOTE — PROGRESS NOTES
AUDIOLOGY REPORT    SUBJECTIVE:Tyrone Cohn is a 71 year old male who was seen in the Audiology Clinic at the [facility] on 9/30/2020  for a follow-up check regarding the fitting of hearing aids. Tyrone has been seen previously on 2/5/2020, and results revealed a normal sloping to moderately-severe sensorineural hearing loss in the right ear and a normal sloping to profound sensorineural hearing loss in the left. The patient has been seen previously in this clinic and was fit with Phonak Audeo M90-R on 08/19/2020.  Tyrone reports that the left hearing aid sounds like it is under water or softer. He reports going out to socialize more and has been enjoying the hearing aids.     OBJECTIVE: The left hearing aid was checked and sounded weak. A new  was placed and the sound quality improved. Tyrone reported that the hearing aid sound quality improved with the  change. Tyrone asked to review the function of the long button press on the hearing aid. It was reviewed that a long press followed by a long beep is Comfort in Noise and a long press followed by carmen is the startup program.  Care and maintenance of the hearing aids was reviewed with Tyrone. If any concerns with the hearing aid continues, Tyrone in instructed to drop it off so it can be sent in for repair.     No charge visit today (in warranty hearing aid check).     ASSESSMENT: A follow-up appointment for hearing aid fitting was completed today. Changes to hearing aid was completed as outlined above.     PLAN: Tyrone will return again for follow-up within one year to check on progress.Please call this clinic with any questions regarding today s appointment.        Storm Mo, [facility]-A  Licensed Audiologist  [place] #[id number]

## 2021-06-17 ENCOUNTER — TELEPHONE (OUTPATIENT)
Dept: AUDIOLOGY | Facility: CLINIC | Age: 71
End: 2021-06-17

## 2021-06-17 NOTE — TELEPHONE ENCOUNTER
[facility] Call Center    Phone Message    May a detailed message be left on voicemail: yes     Reason for Call: Other: Pt states he dropped his HAs off with Donald on Tuesday and is just looking for a status update on them. Please call Pt to discuss. Thank you.     Action Taken: Message routed to:  [facility] Audiology    Travel Screening: Not Applicable

## 2021-06-17 NOTE — TELEPHONE ENCOUNTER
Called patient back to let him know his left hearing aid has been sent to the  for warranty repair.  He will likely schedule an appointment to  the repaired hearing aid and have a new hearing test as his recent issues not hearing well from his left hearing aid may be related to his hearing and not the device.

## 2021-06-17 NOTE — PROGRESS NOTES
RESPIRATORY CARE NOTE     Patient Name: Tyrone Cohn  Today's Date: 3/28/2018     Complete PFT done. Pt performed tests with good effort. Test results meet ATS criteria. Results scanned into epic. Pt left in no distress.       Sheba Roy RRT

## 2021-07-29 ENCOUNTER — OFFICE VISIT (OUTPATIENT)
Dept: AUDIOLOGY | Facility: CLINIC | Age: 71
End: 2021-07-29
Payer: OTHER MISCELLANEOUS

## 2021-07-29 DIAGNOSIS — H90.3 SENSORINEURAL HEARING LOSS, BILATERAL: Primary | ICD-10-CM

## 2021-07-29 PROCEDURE — 99207 PR ASSESSMENT FOR HEARING AID: CPT | Performed by: AUDIOLOGIST

## 2021-07-29 NOTE — PROGRESS NOTES
"AUDIOLOGY REPORT    SUBJECTIVE:Tyrone Cohn is a 71 year old male who was seen in the Audiology Clinic at the [facility] on 7/29/21  for a follow-up check of his hearing aids and to pick the left hearing from repair. Tyrone has been seen previously on 2/5/2020, and results revealed a normal sloping to moderately-severe sensorineural hearing loss in the right ear and a normal sloping to profound sensorineural hearing loss in the left. The patient has been seen previously in this clinic and was fit with bilateral Phonak Audeo M90-R on 08/19/2020. A fourth year Audiology Doctoral student is present during the appointment and assisted during the appointment as well.    OBJECTIVE:   Left hearing aid is returned to patient. Spent some time taking and both hearing aids currently sound good and balanced today. Reviewed that he needs to try to use his remote microphone more to see that benefit. Reviewed that his left hearing aid was \"overhauled\" during repair and is basically a new hearing aid.      ASSESSMENT: A follow-up appointment for hearing aid's was completed today. Changes to hearing aid was completed as outlined above. No charge visit today (in warranty hearing aid check).    PLAN:Tyrone will return for follow-up as needed, or at least every 9-12 months for cleaning and assessment of hearing aid.  He is due for a repeat audiogram sometime this coming winter. Please call this clinic with any questions regarding today s appointment.      Storm Mo, [facility]-A  Licensed Audiologist  [place] #[id number]        "

## 2021-08-20 ENCOUNTER — LAB REQUISITION (OUTPATIENT)
Dept: LAB | Facility: CLINIC | Age: 71
End: 2021-08-20
Payer: COMMERCIAL

## 2021-08-20 DIAGNOSIS — Z12.5 ENCOUNTER FOR SCREENING FOR MALIGNANT NEOPLASM OF PROSTATE: ICD-10-CM

## 2021-08-20 DIAGNOSIS — Z13.1 ENCOUNTER FOR SCREENING FOR DIABETES MELLITUS: ICD-10-CM

## 2021-08-20 DIAGNOSIS — Z13.220 ENCOUNTER FOR SCREENING FOR LIPOID DISORDERS: ICD-10-CM

## 2021-08-20 LAB
ANION GAP SERPL CALCULATED.3IONS-SCNC: 12 MMOL/L (ref 5–18)
BUN SERPL-MCNC: 20 MG/DL (ref 8–22)
CALCIUM SERPL-MCNC: 9.5 MG/DL (ref 8.5–10.5)
CHLORIDE BLD-SCNC: 106 MMOL/L (ref 98–107)
CHOLEST SERPL-MCNC: 203 MG/DL
CO2 SERPL-SCNC: 25 MMOL/L (ref 22–31)
CREAT SERPL-MCNC: 1.1 MG/DL (ref 0.7–1.3)
FASTING STATUS PATIENT QL REPORTED: ABNORMAL
GFR SERPL CREATININE-BSD FRML MDRD: 68 ML/MIN/1.73M2
GLUCOSE BLD-MCNC: 117 MG/DL (ref 70–125)
HDLC SERPL-MCNC: 70 MG/DL
LDLC SERPL CALC-MCNC: 110 MG/DL
POTASSIUM BLD-SCNC: 4.4 MMOL/L (ref 3.5–5)
PSA SERPL-MCNC: 1.22 UG/L (ref 0–4.5)
SODIUM SERPL-SCNC: 143 MMOL/L (ref 136–145)
TRIGL SERPL-MCNC: 115 MG/DL

## 2021-08-20 PROCEDURE — 80061 LIPID PANEL: CPT | Mod: ORL | Performed by: FAMILY MEDICINE

## 2021-08-20 PROCEDURE — G0103 PSA SCREENING: HCPCS | Mod: ORL | Performed by: FAMILY MEDICINE

## 2021-08-20 PROCEDURE — 80048 BASIC METABOLIC PNL TOTAL CA: CPT | Mod: ORL | Performed by: FAMILY MEDICINE

## 2021-08-30 ENCOUNTER — TELEPHONE (OUTPATIENT)
Dept: AUDIOLOGY | Facility: CLINIC | Age: 71
End: 2021-08-30

## 2021-08-30 NOTE — TELEPHONE ENCOUNTER
Walk-in hearing aid services on 8/30/21: The patient reported his left hearing aid wasn't turning on.  The hearing aid was cleaned and the  was replaced under warranty.  The device was placed in the patient's  and it was found to charge, turn off and on as expected.  Mr. Cohn was advised to return to have the hearing aid sent in for repair if any issues persist.

## 2021-09-05 ENCOUNTER — HEALTH MAINTENANCE LETTER (OUTPATIENT)
Age: 71
End: 2021-09-05

## 2021-11-30 ENCOUNTER — HOSPITAL ENCOUNTER (EMERGENCY)
Facility: CLINIC | Age: 71
Discharge: HOME | End: 2021-11-30
Attending: STUDENT IN AN ORGANIZED HEALTH CARE EDUCATION/TRAINING PROGRAM | Admitting: STUDENT IN AN ORGANIZED HEALTH CARE EDUCATION/TRAINING PROGRAM
Payer: COMMERCIAL

## 2021-11-30 VITALS
HEART RATE: 58 BPM | SYSTOLIC BLOOD PRESSURE: 142 MMHG | RESPIRATION RATE: 16 BRPM | OXYGEN SATURATION: 97 % | TEMPERATURE: 98.2 F | DIASTOLIC BLOOD PRESSURE: 77 MMHG

## 2021-11-30 DIAGNOSIS — S91.312A LACERATION OF LEFT FOOT, INITIAL ENCOUNTER: ICD-10-CM

## 2021-11-30 PROCEDURE — 99283 EMERGENCY DEPT VISIT LOW MDM: CPT

## 2021-11-30 PROCEDURE — 90471 IMMUNIZATION ADMIN: CPT | Performed by: STUDENT IN AN ORGANIZED HEALTH CARE EDUCATION/TRAINING PROGRAM

## 2021-11-30 PROCEDURE — 12001 RPR S/N/AX/GEN/TRNK 2.5CM/<: CPT

## 2021-11-30 PROCEDURE — 250N000011 HC RX IP 250 OP 636: Performed by: STUDENT IN AN ORGANIZED HEALTH CARE EDUCATION/TRAINING PROGRAM

## 2021-11-30 PROCEDURE — 90715 TDAP VACCINE 7 YRS/> IM: CPT | Performed by: STUDENT IN AN ORGANIZED HEALTH CARE EDUCATION/TRAINING PROGRAM

## 2021-11-30 RX ADMIN — CLOSTRIDIUM TETANI TOXOID ANTIGEN (FORMALDEHYDE INACTIVATED), CORYNEBACTERIUM DIPHTHERIAE TOXOID ANTIGEN (FORMALDEHYDE INACTIVATED), BORDETELLA PERTUSSIS TOXOID ANTIGEN (GLUTARALDEHYDE INACTIVATED), BORDETELLA PERTUSSIS FILAMENTOUS HEMAGGLUTININ ANTIGEN (FORMALDEHYDE INACTIVATED), BORDETELLA PERTUSSIS PERTACTIN ANTIGEN, AND BORDETELLA PERTUSSIS FIMBRIAE 2/3 ANTIGEN 0.5 ML: 5; 2; 2.5; 5; 3; 5 INJECTION, SUSPENSION INTRAMUSCULAR at 23:23

## 2021-12-01 ENCOUNTER — LAB REQUISITION (OUTPATIENT)
Dept: LAB | Facility: CLINIC | Age: 71
End: 2021-12-01
Payer: COMMERCIAL

## 2021-12-01 DIAGNOSIS — Z01.818 ENCOUNTER FOR OTHER PREPROCEDURAL EXAMINATION: ICD-10-CM

## 2021-12-01 PROCEDURE — U0003 INFECTIOUS AGENT DETECTION BY NUCLEIC ACID (DNA OR RNA); SEVERE ACUTE RESPIRATORY SYNDROME CORONAVIRUS 2 (SARS-COV-2) (CORONAVIRUS DISEASE [COVID-19]), AMPLIFIED PROBE TECHNIQUE, MAKING USE OF HIGH THROUGHPUT TECHNOLOGIES AS DESCRIBED BY CMS-2020-01-R: HCPCS | Mod: ORL | Performed by: FAMILY MEDICINE

## 2021-12-01 NOTE — ED PROVIDER NOTES
Emergency Department Encounter         FINAL IMPRESSION:  Foot laceration        ED COURSE AND MEDICAL DECISION MAKING     Patient is a 71-year-old male with no chronic medical problems, here with left foot laceration.  Patient was cutting a piece of old well pipe with a an angle  an angle  slipped and cut his anterior distal left ankle/foot.  No arterial bleeding.  States it was oozing blood.  No difficult ambulation.  Arrival he looks well.    On examination patient has a 1 cm laceration that is linear to the distal left lower leg that is just proximal to the crease in his ankle.  No arterial component.  Wound was extensively washed out, and then explored.  Appears to have nicked the tibialis anterior muscle with no significant decrease in range of motion.  Dorsalis pedis pulse is still bounding.  Posterior tibial pulses still bounding.  Wound was sutured with 1 suture.  Anesthetized with bupivacaine with epinephrine.  Wound precautions given.  Patient was discharged home in stable condition.               10:48 PM I met the patient and performed my initial interview and exam.          At the conclusion of the encounter I discussed the results of all the tests and the disposition. The questions were answered. The patient or family acknowledged understanding and was agreeable with the care plan.        MEDICATIONS GIVEN IN THE EMERGENCY DEPARTMENT:  Medications - No data to display    NEW PRESCRIPTIONS STARTED AT TODAY'S ED VISIT:  New Prescriptions    No medications on file       HPI     Patient information obtained from: Patient    Use of Interpretor: N/A     Tyrone Cohn is a 71 year old male with a pertinent history of atrial fibrillation who presents to this ED by car for evaluation of laceration.        REVIEW OF SYSTEMS:  Review of Systems   Constitutional: Negative for fever, malaise  HEENT: Negative runny nose, sore throat, ear pain, neck pain  Respiratory: Negative for shortness of  breath, cough, congestion  Cardiovascular: Negative for chest pain, leg edema  Gastrointestinal: Negative for abdominal distention, abdominal pain, constipation, vomiting, nausea, diarrhea  Genitourinary: Negative for dysuria and hematuria.   Integument: Positive for laceration. Negative for rash, skin breakdown  Neurological: Negative for paresthesias, weakness, headache.  Musculoskeletal: Negative for joint pain, joint swelling      All other systems reviewed and are negative.          MEDICAL HISTORY     No past medical history on file.    Past Surgical History:   Procedure Laterality Date     APPENDECTOMY       BIOPSY SKIN (LOCATION)       COLON SURGERY      REMOVAL OF POLYPS     FINGER SURGERY Left     THUMB, POINTER AND MIDDLE FINGER     FRACTURE SURGERY       HERNIA REPAIR       KNEE SURGERY Bilateral     TO REPAIR CARTILATE     LAPAROTOMY EXPLORATORY N/A 2016    Procedure: EXPLORATORY LAPAROTOMY , LYSIS OF ADHESIONS, APPENDECTOMY;  Surgeon: Dalton Galicia MD;  Location: [facility];  Service:      OTHER SURGICAL HISTORY Right      BONE SPUR     SPINAL FUSION      C5-C6     TONSILLECTOMY      AND ADENOIDS     UVULOPALATOPHARYNGOPLASTY AND SEPTOPLASTY         Social History     Tobacco Use     Smoking status: Former Smoker     Packs/day: 1.00     Years: 7.00     Pack years: 7.00     Types: Cigarettes     Quit date: 1978     Years since quittin.9     Smokeless tobacco: Never Used   Substance Use Topics     Alcohol use: Yes     Comment: Alcoholic Drinks/day: 3 drinks per week of wine, liquor or beer     Drug use: No       tadalafil (CIALIS) 20 MG tablet            PHYSICAL EXAM     BP (!) 142/77   Pulse 58   Temp 98.2  F (36.8  C) (Oral)   Resp 16   SpO2 97%       PHYSICAL EXAM:     General: Patient appears well, nontoxic, comfortable  HEENT: Moist mucous membranes, no tongue swelling.  No head trauma.  No midline neck pain.  Cardiovascular: Normal rate, normal rhythm, no extremity  edema.  No appreciable murmur.  Respiratory: No signs of respiratory distress, lungs are clear to auscultation bilaterally with no wheezes rhonchi or rales.  Abdominal: Soft, nontender, nondistended, no palpable masses, no guarding, no rebound  Musculoskeletal: Laceration noted to distal left leg.  No arterial bleeding.  Normal dorsalis pedis pulse.  Normal posterior tibial pulse.  Forage motion.  Exploration of the wound showing no bony involvement.  No foreign body.  Superficial damage to tibialis anterior  Neurological: Alert and oriented, grossly neurologically intact.  Psychological: Normal affect and mood.  Integument: No rashes appreciated          RESULTS       Labs Ordered and Resulted from Time of ED Arrival to Time of ED Departure - No data to display    No orders to display           PROCEDURES:  Procedures:  [facility]    -Laceration Repair    Date/Time: 11/30/2021 10:49 PM  Performed by: Iban Baldwin DO  Authorized by: Iban Baldwin DO     Risks, benefits and alternatives discussed.      ANESTHESIA (see MAR for exact dosages):     Anesthesia method:  Local infiltration    Local anesthetic:  Bupivacaine 0.5% WITH epi  LACERATION DETAILS     Location:  Leg    Leg location:  L lower leg    Length (cm):  1    REPAIR TYPE:     Repair type:  Simple      EXPLORATION:     Wound extent: muscle damage      Wound extent: fascia not violated, no foreign body, no nerve damage, no tendon damage and no vascular damage      TREATMENT:     Area cleansed with:  Saline    Amount of cleaning:  Standard    Irrigation solution:  Sterile saline    SKIN REPAIR     Repair method:  Sutures    Suture size:  3-0    Suture material:  Prolene    Suture technique:  Simple interrupted    APPROXIMATION     Approximation:  Close    POST-PROCEDURE DETAILS     Dressing:  Non-adherent dressing        PROCEDURE    Patient Tolerance:  Patient tolerated the procedure well with no immediate complications          I, Roge Vitale am serving as a scribe to document services personally performed by Iban Baldwin DO, based on my observations and the provider's statements to me.  I, Iban Baldwin DO, attest that Roge Vitale is acting in a scribe capacity, has observed my performance of the services and has documented them in accordance with my direction.    Iban Baldwin DO  Emergency Medicine  [facility] EMERGENCY ROOM     Iban Baldwin DO  11/30/21 1878

## 2021-12-01 NOTE — ED TRIAGE NOTES
Lt anterior ankle laceration  Pt reports cutting pipe with high speed cutting wheel, pipe snapped and cutting wheel hit lt ankle  Bleeding controlled in triage  tdap 12/9/2012

## 2021-12-02 LAB — SARS-COV-2 RNA RESP QL NAA+PROBE: NEGATIVE

## 2022-02-20 ENCOUNTER — HEALTH MAINTENANCE LETTER (OUTPATIENT)
Age: 72
End: 2022-02-20

## 2022-10-23 ENCOUNTER — HEALTH MAINTENANCE LETTER (OUTPATIENT)
Age: 72
End: 2022-10-23

## 2022-11-17 ENCOUNTER — LAB REQUISITION (OUTPATIENT)
Dept: LAB | Facility: CLINIC | Age: 72
End: 2022-11-17

## 2022-11-17 DIAGNOSIS — R10.33 PERIUMBILICAL PAIN: ICD-10-CM

## 2022-11-17 LAB — LIPASE SERPL-CCNC: 36 U/L (ref 13–60)

## 2022-11-17 PROCEDURE — 80053 COMPREHEN METABOLIC PANEL: CPT | Performed by: PHYSICIAN ASSISTANT

## 2022-11-17 PROCEDURE — 83690 ASSAY OF LIPASE: CPT | Performed by: PHYSICIAN ASSISTANT

## 2022-11-18 LAB
ALBUMIN SERPL BCG-MCNC: 4.3 G/DL (ref 3.5–5.2)
ALP SERPL-CCNC: 86 U/L (ref 40–129)
ALT SERPL W P-5'-P-CCNC: 23 U/L (ref 10–50)
ANION GAP SERPL CALCULATED.3IONS-SCNC: 12 MMOL/L (ref 7–15)
AST SERPL W P-5'-P-CCNC: 23 U/L (ref 10–50)
BILIRUB SERPL-MCNC: 0.3 MG/DL
BUN SERPL-MCNC: 18.4 MG/DL (ref 8–23)
CALCIUM SERPL-MCNC: 9.3 MG/DL (ref 8.8–10.2)
CHLORIDE SERPL-SCNC: 103 MMOL/L (ref 98–107)
CREAT SERPL-MCNC: 0.91 MG/DL (ref 0.67–1.17)
DEPRECATED HCO3 PLAS-SCNC: 24 MMOL/L (ref 22–29)
GFR SERPL CREATININE-BSD FRML MDRD: >90 ML/MIN/1.73M2
GLUCOSE SERPL-MCNC: 101 MG/DL (ref 70–99)
POTASSIUM SERPL-SCNC: 4.1 MMOL/L (ref 3.4–5.3)
PROT SERPL-MCNC: 6.2 G/DL (ref 6.4–8.3)
SODIUM SERPL-SCNC: 139 MMOL/L (ref 136–145)

## 2023-01-05 ENCOUNTER — OFFICE VISIT (OUTPATIENT)
Dept: AUDIOLOGY | Facility: CLINIC | Age: 73
End: 2023-01-05
Payer: OTHER MISCELLANEOUS

## 2023-01-05 DIAGNOSIS — H90.3 SENSORINEURAL HEARING LOSS, BILATERAL: Primary | ICD-10-CM

## 2023-01-05 PROCEDURE — 99207 PR ASSESSMENT FOR HEARING AID: CPT | Performed by: AUDIOLOGIST

## 2023-02-02 ENCOUNTER — LAB REQUISITION (OUTPATIENT)
Dept: LAB | Facility: CLINIC | Age: 73
End: 2023-02-02

## 2023-02-02 DIAGNOSIS — Z12.5 ENCOUNTER FOR SCREENING FOR MALIGNANT NEOPLASM OF PROSTATE: ICD-10-CM

## 2023-02-02 PROCEDURE — G0103 PSA SCREENING: HCPCS | Performed by: FAMILY MEDICINE

## 2023-02-03 LAB — PSA SERPL-MCNC: 1.17 NG/ML (ref 0–6.5)

## 2023-04-02 ENCOUNTER — HEALTH MAINTENANCE LETTER (OUTPATIENT)
Age: 73
End: 2023-04-02

## 2023-04-28 ENCOUNTER — LAB REQUISITION (OUTPATIENT)
Dept: LAB | Facility: CLINIC | Age: 73
End: 2023-04-28

## 2023-04-28 DIAGNOSIS — M17.0 BILATERAL PRIMARY OSTEOARTHRITIS OF KNEE: ICD-10-CM

## 2023-04-28 PROCEDURE — 86140 C-REACTIVE PROTEIN: CPT | Performed by: FAMILY MEDICINE

## 2023-04-29 LAB — CRP SERPL-MCNC: <3 MG/L

## 2023-06-16 NOTE — MR AVS SNAPSHOT
After Visit Summary   10/1/2018    Tyrone Cohn    MRN: [id number]           Patient Information     Date Of Birth          1950        Visit Information        Provider Department      10/1/2018 9:30 AM Vivien Francois, [facility] Audiology        Today's Diagnoses     Sensorineural hearing loss, bilateral    -  1       Follow-ups after your visit        Who to contact     Please call your clinic at [phone] to:    Ask questions about your health    Make or cancel appointments    Discuss your medicines    Learn about your test results    Speak to your doctor            Additional Information About Your Visit        Care EveryWhere ID     This is your Care EveryWhere ID. This could be used by other organizations to access your [place] medical records  [id number]         Blood Pressure from Last 3 Encounters:   No data found for BP    Weight from Last 3 Encounters:   No data found for Wt              We Performed the Following     No Charge, Hearing Aid Clinic Visit        Primary Care Provider    None Specified       No primary provider on file.        Equal Access to Services     [facility]: Hadii suzanne Hebert, waaxhaley taylor, qaybta kaalmada miriam, toy sanders . So [facility] [phone].    ATENCIÓN: Si habla español, tiene a loera disposición servicios gratuitos de asistencia lingüística. Llame al [phone].    We comply with applicable federal civil rights laws and [place] laws. We do not discriminate on the basis of race, color, national origin, age, disability, sex, sexual orientation, or gender identity.            Thank you!     Thank you for choosing [facility] AUDIOLOGY  for your care. Our goal is always to provide you with excellent care. Hearing back from our patients is one way we can continue to improve our services. Please take a few minutes to complete the written survey that you may receive in the mail after your visit with us.  Thank you!             Your Updated Medication List - Protect others around you: Learn how to safely use, store and throw away your medicines at www.disposemymeds.org.      Notice  As of 10/1/2018 11:59 PM    You have not been prescribed any medications.       "I'm depressed"

## 2023-11-09 ENCOUNTER — TELEPHONE (OUTPATIENT)
Dept: AUDIOLOGY | Facility: CLINIC | Age: 73
End: 2023-11-09
Payer: COMMERCIAL

## 2023-11-13 ENCOUNTER — TELEPHONE (OUTPATIENT)
Dept: AUDIOLOGY | Facility: CLINIC | Age: 73
End: 2023-11-13
Payer: COMMERCIAL

## 2023-11-13 NOTE — TELEPHONE ENCOUNTER
[facility] Call Center    Phone Message    May a detailed message be left on voicemail: yes     Reason for Call: Other: PT was rescheduled for appt from 11/17 to 11/22 and he is not available on Wednesdays.  Please call pt back to reschedule as provider was not available on 11/17.  [facility] [place], Thanks     Action Taken: Other: AUD    Travel Screening: Not Applicable

## 2023-11-14 NOTE — TELEPHONE ENCOUNTER
LVM to reschedule as patient stated they were not available on Wed.     Patient can reschedule to ENT audio and Hearing aid check on separate days to get in sooner    Or Hearing aid check with audio next available if they want same day      Gave call center number

## 2023-11-27 ENCOUNTER — OFFICE VISIT (OUTPATIENT)
Dept: AUDIOLOGY | Facility: CLINIC | Age: 73
End: 2023-11-27
Payer: OTHER MISCELLANEOUS

## 2023-11-27 ENCOUNTER — TELEPHONE (OUTPATIENT)
Dept: AUDIOLOGY | Facility: CLINIC | Age: 73
End: 2023-11-27
Payer: COMMERCIAL

## 2023-11-27 DIAGNOSIS — H90.3 SENSORINEURAL HEARING LOSS, BILATERAL: Primary | ICD-10-CM

## 2023-11-27 PROCEDURE — 92550 TYMPANOMETRY & REFLEX THRESH: CPT | Mod: 52 | Performed by: AUDIOLOGIST

## 2023-11-27 PROCEDURE — 92557 COMPREHENSIVE HEARING TEST: CPT | Performed by: AUDIOLOGIST

## 2023-11-27 NOTE — TELEPHONE ENCOUNTER
LVM requesting patient come for ENT Audio at 1:30 or 2:00 PM today, 11/27/2023    Storm Concepcion, [facility]  Licensed Audiologist  [place] #[id number]

## 2023-11-27 NOTE — PROGRESS NOTES
AUDIOLOGY REPORT    SUMMARY: Audiology visit completed. See audiogram for results.      RECOMMENDATIONS: Return for hearing aid check as scheduled. Repeat audiologic evaluation if changes are noted.     Storm Concepcion, [facility]  Licensed Audiologist  [place] #[id number]

## 2023-12-01 ENCOUNTER — OFFICE VISIT (OUTPATIENT)
Dept: AUDIOLOGY | Facility: CLINIC | Age: 73
End: 2023-12-01
Payer: COMMERCIAL

## 2023-12-01 DIAGNOSIS — H90.3 SENSORINEURAL HEARING LOSS, BILATERAL: Primary | ICD-10-CM

## 2023-12-01 PROCEDURE — 99207 PR ASSESSMENT FOR HEARING AID: CPT | Performed by: AUDIOLOGIST

## 2023-12-01 NOTE — PROGRESS NOTES
AUDIOLOGY REPORT    SUBJECTIVE: Tyrone Cohn (Jim), 73 year old year old male, was seen at the Audiology Clinic at the [facility] [place] on 12/01/23 for a hearing aid check. Fantas hearing was previously assessed on 11/27/2023 and results revealed normal sloping to severe sensorineural hearing loss bilaterally.  The patient has been seen previously in this clinic and was fit with bilateral Phonak Audeo M90-R hearing aids on 8/19/2020.     Today, Giovanni reports he is struggling to understand the clarity of speech and background noise is too loud.        OBJECTIVE: Based on patient report, the following changes were made:     1) Replaced the left 2S  with a 2M  and sound quality improved. Moderate level sounds from 1-4 kHz were increased to improve speech clarity. NoiseBlock was increased 4 steps in speech in noise and speech in loud noise to reduce background noise.     Tyrone reports satisfaction with today's adjustments.     ASSESSMENT: Hearing aid check completed. Changes to hearing aid was completed as outlined above. I forgot to inform the patient that his hearing aids are out of warranty. No charge appointment today, but he will be billed for future appointments. If the patient contacts me to address the adjustments made today, that will be a no charge follow-up.       PLAN: It is recommended that Giovanni contact me if there are concerns about today's adjustments. He should return for follow-up as needed, or at least every 9-12 months for cleaning and assessment of hearing aid.   Please call this clinic with any questions regarding today s appointment.      Storm Ram, CCC-A  Clinical Audiologist   [place] #[id number]

## 2023-12-30 NOTE — PROGRESS NOTES
Discharge plan according to [facility]:    11/13/23 1026   Discharge Planning   Patient/Family Anticipates Transition to home   Concerns to be Addressed all concerns addressed in this encounter   Living Arrangements   People in Home significant other;child(jonathan), adult   Type of Residence Private Residence   Is your private residence a single family home or apartment? Single family home   Stair Railings, Within Home, Primary railings safe and in good condition   Once home, are you able to live on one level? No   Which level? Main Level   Bathroom Shower/Tub Tub/Shower unit   Equipment Currently Used at Home none   Support System   Support Systems Spouse/Significant Other;Children   Do you have someone available to stay with you one or two nights once you are home? Yes

## 2024-01-05 ENCOUNTER — LAB REQUISITION (OUTPATIENT)
Dept: LAB | Facility: CLINIC | Age: 74
End: 2024-01-05

## 2024-01-05 DIAGNOSIS — Z01.818 ENCOUNTER FOR OTHER PREPROCEDURAL EXAMINATION: ICD-10-CM

## 2024-01-05 PROCEDURE — 80048 BASIC METABOLIC PNL TOTAL CA: CPT | Performed by: STUDENT IN AN ORGANIZED HEALTH CARE EDUCATION/TRAINING PROGRAM

## 2024-01-06 LAB
ANION GAP SERPL CALCULATED.3IONS-SCNC: 9 MMOL/L (ref 7–15)
BUN SERPL-MCNC: 13.9 MG/DL (ref 8–23)
CALCIUM SERPL-MCNC: 9.1 MG/DL (ref 8.8–10.2)
CHLORIDE SERPL-SCNC: 105 MMOL/L (ref 98–107)
CREAT SERPL-MCNC: 0.91 MG/DL (ref 0.67–1.17)
DEPRECATED HCO3 PLAS-SCNC: 28 MMOL/L (ref 22–29)
EGFRCR SERPLBLD CKD-EPI 2021: 90 ML/MIN/1.73M2
GLUCOSE SERPL-MCNC: 96 MG/DL (ref 70–99)
POTASSIUM SERPL-SCNC: 4.3 MMOL/L (ref 3.4–5.3)
SODIUM SERPL-SCNC: 142 MMOL/L (ref 135–145)

## 2024-01-10 NOTE — PROGRESS NOTES
Patient is planning to discharge to home, with help from his significant other, on the day of surgery in the afternoon.    Kalpana Campoverde RN

## 2024-01-11 ENCOUNTER — ANESTHESIA EVENT (OUTPATIENT)
Dept: SURGERY | Facility: CLINIC | Age: 74
End: 2024-01-11
Payer: COMMERCIAL

## 2024-01-12 ENCOUNTER — HOSPITAL ENCOUNTER (OUTPATIENT)
Facility: CLINIC | Age: 74
Discharge: HOME | End: 2024-01-12
Attending: ORTHOPAEDIC SURGERY | Admitting: ORTHOPAEDIC SURGERY
Payer: COMMERCIAL

## 2024-01-12 ENCOUNTER — ANESTHESIA (OUTPATIENT)
Dept: SURGERY | Facility: CLINIC | Age: 74
End: 2024-01-12
Payer: COMMERCIAL

## 2024-01-12 VITALS
WEIGHT: 170 LBS | TEMPERATURE: 97.3 F | HEART RATE: 58 BPM | HEIGHT: 76 IN | SYSTOLIC BLOOD PRESSURE: 140 MMHG | OXYGEN SATURATION: 98 % | BODY MASS INDEX: 20.7 KG/M2 | DIASTOLIC BLOOD PRESSURE: 69 MMHG | RESPIRATION RATE: 11 BRPM

## 2024-01-12 DIAGNOSIS — M17.11 PRIMARY OSTEOARTHRITIS OF RIGHT KNEE: Primary | ICD-10-CM

## 2024-01-12 PROCEDURE — 250N000011 HC RX IP 250 OP 636: Performed by: NURSE ANESTHETIST, CERTIFIED REGISTERED

## 2024-01-12 PROCEDURE — 258N000001 HC RX 258: Performed by: ORTHOPAEDIC SURGERY

## 2024-01-12 PROCEDURE — 250N000009 HC RX 250: Performed by: NURSE ANESTHETIST, CERTIFIED REGISTERED

## 2024-01-12 PROCEDURE — 97110 THERAPEUTIC EXERCISES: CPT | Mod: GP

## 2024-01-12 PROCEDURE — 97161 PT EVAL LOW COMPLEX 20 MIN: CPT | Mod: GP

## 2024-01-12 PROCEDURE — 370N000017 HC ANESTHESIA TECHNICAL FEE, PER MIN: Performed by: ORTHOPAEDIC SURGERY

## 2024-01-12 PROCEDURE — 272N000001 HC OR GENERAL SUPPLY STERILE: Performed by: ORTHOPAEDIC SURGERY

## 2024-01-12 PROCEDURE — 250N000013 HC RX MED GY IP 250 OP 250 PS 637: Performed by: PHYSICIAN ASSISTANT

## 2024-01-12 PROCEDURE — 250N000011 HC RX IP 250 OP 636: Performed by: PHYSICIAN ASSISTANT

## 2024-01-12 PROCEDURE — 97116 GAIT TRAINING THERAPY: CPT | Mod: GP

## 2024-01-12 PROCEDURE — 710N000012 HC RECOVERY PHASE 2, PER MINUTE: Performed by: ORTHOPAEDIC SURGERY

## 2024-01-12 PROCEDURE — 250N000009 HC RX 250: Performed by: ORTHOPAEDIC SURGERY

## 2024-01-12 PROCEDURE — C1776 JOINT DEVICE (IMPLANTABLE): HCPCS | Performed by: ORTHOPAEDIC SURGERY

## 2024-01-12 PROCEDURE — 250N000013 HC RX MED GY IP 250 OP 250 PS 637: Performed by: ORTHOPAEDIC SURGERY

## 2024-01-12 PROCEDURE — 999N000141 HC STATISTIC PRE-PROCEDURE NURSING ASSESSMENT: Performed by: ORTHOPAEDIC SURGERY

## 2024-01-12 PROCEDURE — 258N000003 HC RX IP 258 OP 636: Performed by: STUDENT IN AN ORGANIZED HEALTH CARE EDUCATION/TRAINING PROGRAM

## 2024-01-12 PROCEDURE — 360N000077 HC SURGERY LEVEL 4, PER MIN: Performed by: ORTHOPAEDIC SURGERY

## 2024-01-12 PROCEDURE — 250N000011 HC RX IP 250 OP 636: Performed by: ANESTHESIOLOGY

## 2024-01-12 PROCEDURE — 710N000010 HC RECOVERY PHASE 1, LEVEL 2, PER MIN: Performed by: ORTHOPAEDIC SURGERY

## 2024-01-12 PROCEDURE — 250N000009 HC RX 250: Performed by: PHYSICIAN ASSISTANT

## 2024-01-12 PROCEDURE — C1713 ANCHOR/SCREW BN/BN,TIS/BN: HCPCS | Performed by: ORTHOPAEDIC SURGERY

## 2024-01-12 DEVICE — IMPLANTABLE DEVICE
Type: IMPLANTABLE DEVICE | Site: KNEE | Status: FUNCTIONAL
Brand: PERSONA® NATURAL TIBIA®

## 2024-01-12 DEVICE — BONE CEMENT RADIOPAQUE SIMPLEX HV FULL DOSE 6194-1-001: Type: IMPLANTABLE DEVICE | Site: KNEE | Status: FUNCTIONAL

## 2024-01-12 DEVICE — IMPLANTABLE DEVICE
Type: IMPLANTABLE DEVICE | Site: KNEE | Status: FUNCTIONAL
Brand: PERSONA®

## 2024-01-12 RX ORDER — OXYCODONE HYDROCHLORIDE 5 MG/1
5 TABLET ORAL EVERY 4 HOURS PRN
Status: DISCONTINUED | OUTPATIENT
Start: 2024-01-12 | End: 2024-01-12 | Stop reason: HOSPADM

## 2024-01-12 RX ORDER — ONDANSETRON 4 MG/1
4 TABLET, ORALLY DISINTEGRATING ORAL EVERY 6 HOURS PRN
Status: CANCELLED | OUTPATIENT
Start: 2024-01-12

## 2024-01-12 RX ORDER — ONDANSETRON 2 MG/ML
4 INJECTION INTRAMUSCULAR; INTRAVENOUS EVERY 6 HOURS PRN
Status: CANCELLED | OUTPATIENT
Start: 2024-01-12

## 2024-01-12 RX ORDER — DEXAMETHASONE SODIUM PHOSPHATE 4 MG/ML
INJECTION, SOLUTION INTRA-ARTICULAR; INTRALESIONAL; INTRAMUSCULAR; INTRAVENOUS; SOFT TISSUE PRN
Status: DISCONTINUED | OUTPATIENT
Start: 2024-01-12 | End: 2024-01-12

## 2024-01-12 RX ORDER — ONDANSETRON 2 MG/ML
4 INJECTION INTRAMUSCULAR; INTRAVENOUS EVERY 30 MIN PRN
Status: DISCONTINUED | OUTPATIENT
Start: 2024-01-12 | End: 2024-01-12 | Stop reason: HOSPADM

## 2024-01-12 RX ORDER — CEFAZOLIN SODIUM/WATER 2 G/20 ML
2 SYRINGE (ML) INTRAVENOUS SEE ADMIN INSTRUCTIONS
Status: DISCONTINUED | OUTPATIENT
Start: 2024-01-12 | End: 2024-01-12 | Stop reason: HOSPADM

## 2024-01-12 RX ORDER — HYDROMORPHONE HCL IN WATER/PF 6 MG/30 ML
0.2 PATIENT CONTROLLED ANALGESIA SYRINGE INTRAVENOUS EVERY 5 MIN PRN
Status: DISCONTINUED | OUTPATIENT
Start: 2024-01-12 | End: 2024-01-12 | Stop reason: HOSPADM

## 2024-01-12 RX ORDER — SODIUM CHLORIDE, SODIUM LACTATE, POTASSIUM CHLORIDE, CALCIUM CHLORIDE 600; 310; 30; 20 MG/100ML; MG/100ML; MG/100ML; MG/100ML
INJECTION, SOLUTION INTRAVENOUS CONTINUOUS
Status: DISCONTINUED | OUTPATIENT
Start: 2024-01-12 | End: 2024-01-12 | Stop reason: HOSPADM

## 2024-01-12 RX ORDER — BUPIVACAINE HYDROCHLORIDE 5 MG/ML
INJECTION, SOLUTION EPIDURAL; INTRACAUDAL
Status: COMPLETED | OUTPATIENT
Start: 2024-01-12 | End: 2024-01-12

## 2024-01-12 RX ORDER — LIDOCAINE 40 MG/G
CREAM TOPICAL
Status: DISCONTINUED | OUTPATIENT
Start: 2024-01-12 | End: 2024-01-12 | Stop reason: HOSPADM

## 2024-01-12 RX ORDER — FENTANYL CITRATE 50 UG/ML
25 INJECTION, SOLUTION INTRAMUSCULAR; INTRAVENOUS EVERY 5 MIN PRN
Status: DISCONTINUED | OUTPATIENT
Start: 2024-01-12 | End: 2024-01-12 | Stop reason: HOSPADM

## 2024-01-12 RX ORDER — LIDOCAINE 40 MG/G
CREAM TOPICAL
Status: CANCELLED | OUTPATIENT
Start: 2024-01-12

## 2024-01-12 RX ORDER — MAGNESIUM SULFATE 4 G/50ML
4 INJECTION INTRAVENOUS ONCE
Status: COMPLETED | OUTPATIENT
Start: 2024-01-12 | End: 2024-01-12

## 2024-01-12 RX ORDER — ASPIRIN 81 MG/1
81 TABLET ORAL 2 TIMES DAILY
Qty: 60 TABLET | Refills: 0 | Status: SHIPPED | OUTPATIENT
Start: 2024-01-12

## 2024-01-12 RX ORDER — CEFADROXIL 500 MG/1
500 CAPSULE ORAL 2 TIMES DAILY
Qty: 10 CAPSULE | Refills: 0 | Status: SHIPPED | OUTPATIENT
Start: 2024-01-12 | End: 2024-01-17

## 2024-01-12 RX ORDER — ACETAMINOPHEN 325 MG/1
975 TABLET ORAL EVERY 8 HOURS
Status: DISCONTINUED | OUTPATIENT
Start: 2024-01-12 | End: 2024-01-12 | Stop reason: HOSPADM

## 2024-01-12 RX ORDER — ONDANSETRON 2 MG/ML
INJECTION INTRAMUSCULAR; INTRAVENOUS PRN
Status: DISCONTINUED | OUTPATIENT
Start: 2024-01-12 | End: 2024-01-12

## 2024-01-12 RX ORDER — MAGNESIUM HYDROXIDE 1200 MG/15ML
LIQUID ORAL PRN
Status: DISCONTINUED | OUTPATIENT
Start: 2024-01-12 | End: 2024-01-12 | Stop reason: HOSPADM

## 2024-01-12 RX ORDER — SILDENAFIL 100 MG/1
100 TABLET, FILM COATED ORAL DAILY PRN
COMMUNITY

## 2024-01-12 RX ORDER — CEFAZOLIN SODIUM 2 G/100ML
2 INJECTION, SOLUTION INTRAVENOUS EVERY 8 HOURS
Qty: 200 ML | Refills: 0 | Status: DISCONTINUED | OUTPATIENT
Start: 2024-01-12 | End: 2024-01-12 | Stop reason: HOSPADM

## 2024-01-12 RX ORDER — OXYCODONE HYDROCHLORIDE 5 MG/1
10 TABLET ORAL EVERY 4 HOURS PRN
Status: DISCONTINUED | OUTPATIENT
Start: 2024-01-12 | End: 2024-01-12 | Stop reason: HOSPADM

## 2024-01-12 RX ORDER — SODIUM CHLORIDE, SODIUM LACTATE, POTASSIUM CHLORIDE, CALCIUM CHLORIDE 600; 310; 30; 20 MG/100ML; MG/100ML; MG/100ML; MG/100ML
INJECTION, SOLUTION INTRAVENOUS CONTINUOUS
Status: CANCELLED | OUTPATIENT
Start: 2024-01-12

## 2024-01-12 RX ORDER — FENTANYL CITRATE 50 UG/ML
50 INJECTION, SOLUTION INTRAMUSCULAR; INTRAVENOUS EVERY 5 MIN PRN
Status: DISCONTINUED | OUTPATIENT
Start: 2024-01-12 | End: 2024-01-12 | Stop reason: HOSPADM

## 2024-01-12 RX ORDER — PROPOFOL 10 MG/ML
INJECTION, EMULSION INTRAVENOUS CONTINUOUS PRN
Status: DISCONTINUED | OUTPATIENT
Start: 2024-01-12 | End: 2024-01-12

## 2024-01-12 RX ORDER — AMOXICILLIN 250 MG
1 CAPSULE ORAL 2 TIMES DAILY
Status: CANCELLED | OUTPATIENT
Start: 2024-01-12

## 2024-01-12 RX ORDER — ASPIRIN 81 MG/1
81 TABLET ORAL 2 TIMES DAILY
Status: CANCELLED | OUTPATIENT
Start: 2024-01-12

## 2024-01-12 RX ORDER — AMOXICILLIN 250 MG
1-2 CAPSULE ORAL 2 TIMES DAILY
Qty: 30 TABLET | Refills: 0 | Status: SHIPPED | OUTPATIENT
Start: 2024-01-12

## 2024-01-12 RX ORDER — OXYCODONE HYDROCHLORIDE 5 MG/1
5-10 TABLET ORAL EVERY 4 HOURS PRN
Qty: 30 TABLET | Refills: 0 | Status: SHIPPED | OUTPATIENT
Start: 2024-01-12

## 2024-01-12 RX ORDER — NALOXONE HYDROCHLORIDE 0.4 MG/ML
0.4 INJECTION, SOLUTION INTRAMUSCULAR; INTRAVENOUS; SUBCUTANEOUS
Status: DISCONTINUED | OUTPATIENT
Start: 2024-01-12 | End: 2024-01-12 | Stop reason: HOSPADM

## 2024-01-12 RX ORDER — CEFAZOLIN SODIUM/WATER 2 G/20 ML
2 SYRINGE (ML) INTRAVENOUS
Status: COMPLETED | OUTPATIENT
Start: 2024-01-12 | End: 2024-01-12

## 2024-01-12 RX ORDER — BISACODYL 10 MG
10 SUPPOSITORY, RECTAL RECTAL DAILY PRN
Status: CANCELLED | OUTPATIENT
Start: 2024-01-12

## 2024-01-12 RX ORDER — HYDROMORPHONE HCL IN WATER/PF 6 MG/30 ML
0.2 PATIENT CONTROLLED ANALGESIA SYRINGE INTRAVENOUS
Status: CANCELLED | OUTPATIENT
Start: 2024-01-12

## 2024-01-12 RX ORDER — IBUPROFEN 200 MG
1 CAPSULE ORAL DAILY
COMMUNITY

## 2024-01-12 RX ORDER — LIDOCAINE HYDROCHLORIDE 10 MG/ML
INJECTION, SOLUTION INFILTRATION; PERINEURAL PRN
Status: DISCONTINUED | OUTPATIENT
Start: 2024-01-12 | End: 2024-01-12

## 2024-01-12 RX ORDER — NALOXONE HYDROCHLORIDE 0.4 MG/ML
0.2 INJECTION, SOLUTION INTRAMUSCULAR; INTRAVENOUS; SUBCUTANEOUS
Status: DISCONTINUED | OUTPATIENT
Start: 2024-01-12 | End: 2024-01-12 | Stop reason: HOSPADM

## 2024-01-12 RX ORDER — PROCHLORPERAZINE MALEATE 5 MG
5 TABLET ORAL EVERY 6 HOURS PRN
Status: CANCELLED | OUTPATIENT
Start: 2024-01-12

## 2024-01-12 RX ORDER — ACETAMINOPHEN 325 MG/1
650 TABLET ORAL EVERY 4 HOURS PRN
Status: CANCELLED | OUTPATIENT
Start: 2024-01-15

## 2024-01-12 RX ORDER — HYDROMORPHONE HCL IN WATER/PF 6 MG/30 ML
0.4 PATIENT CONTROLLED ANALGESIA SYRINGE INTRAVENOUS
Status: CANCELLED | OUTPATIENT
Start: 2024-01-12

## 2024-01-12 RX ORDER — TRANEXAMIC ACID 650 MG/1
1950 TABLET ORAL ONCE
Status: COMPLETED | OUTPATIENT
Start: 2024-01-12 | End: 2024-01-12

## 2024-01-12 RX ORDER — ACETAMINOPHEN 325 MG/1
650 TABLET ORAL EVERY 4 HOURS PRN
Qty: 100 TABLET | Refills: 0 | Status: SHIPPED | OUTPATIENT
Start: 2024-01-12

## 2024-01-12 RX ORDER — POLYETHYLENE GLYCOL 3350 17 G/17G
17 POWDER, FOR SOLUTION ORAL DAILY
Status: CANCELLED | OUTPATIENT
Start: 2024-01-13

## 2024-01-12 RX ORDER — HYDROMORPHONE HCL IN WATER/PF 6 MG/30 ML
0.4 PATIENT CONTROLLED ANALGESIA SYRINGE INTRAVENOUS EVERY 5 MIN PRN
Status: DISCONTINUED | OUTPATIENT
Start: 2024-01-12 | End: 2024-01-12 | Stop reason: HOSPADM

## 2024-01-12 RX ORDER — FENTANYL CITRATE 50 UG/ML
25-100 INJECTION, SOLUTION INTRAMUSCULAR; INTRAVENOUS
Status: DISCONTINUED | OUTPATIENT
Start: 2024-01-12 | End: 2024-01-12 | Stop reason: HOSPADM

## 2024-01-12 RX ORDER — ONDANSETRON 4 MG/1
4 TABLET, ORALLY DISINTEGRATING ORAL EVERY 30 MIN PRN
Status: DISCONTINUED | OUTPATIENT
Start: 2024-01-12 | End: 2024-01-12 | Stop reason: HOSPADM

## 2024-01-12 RX ADMIN — PROPOFOL 125 MCG/KG/MIN: 10 INJECTION, EMULSION INTRAVENOUS at 07:40

## 2024-01-12 RX ADMIN — OXYCODONE HYDROCHLORIDE 5 MG: 5 TABLET ORAL at 14:09

## 2024-01-12 RX ADMIN — TRANEXAMIC ACID 1950 MG: 650 TABLET ORAL at 05:58

## 2024-01-12 RX ADMIN — BUPIVACAINE HYDROCHLORIDE 15 ML: 5 INJECTION, SOLUTION EPIDURAL; INTRACAUDAL at 07:10

## 2024-01-12 RX ADMIN — MAGNESIUM SULFATE HEPTAHYDRATE 4 G: 80 INJECTION, SOLUTION INTRAVENOUS at 07:40

## 2024-01-12 RX ADMIN — DEXAMETHASONE SODIUM PHOSPHATE 4 MG: 4 INJECTION, SOLUTION INTRA-ARTICULAR; INTRALESIONAL; INTRAMUSCULAR; INTRAVENOUS; SOFT TISSUE at 07:46

## 2024-01-12 RX ADMIN — BUPIVACAINE HYDROCHLORIDE 2.6 ML: 5 INJECTION, SOLUTION EPIDURAL; INTRACAUDAL; PERINEURAL at 07:32

## 2024-01-12 RX ADMIN — SODIUM CHLORIDE, POTASSIUM CHLORIDE, SODIUM LACTATE AND CALCIUM CHLORIDE: 600; 310; 30; 20 INJECTION, SOLUTION INTRAVENOUS at 07:40

## 2024-01-12 RX ADMIN — LIDOCAINE HYDROCHLORIDE 3 ML: 10 INJECTION, SOLUTION INFILTRATION; PERINEURAL at 07:40

## 2024-01-12 RX ADMIN — FENTANYL CITRATE 100 MCG: 50 INJECTION, SOLUTION INTRAMUSCULAR; INTRAVENOUS at 06:51

## 2024-01-12 RX ADMIN — MIDAZOLAM 2 MG: 1 INJECTION INTRAMUSCULAR; INTRAVENOUS at 06:51

## 2024-01-12 RX ADMIN — ACETAMINOPHEN 975 MG: 325 TABLET ORAL at 14:09

## 2024-01-12 RX ADMIN — ONDANSETRON 4 MG: 2 INJECTION INTRAMUSCULAR; INTRAVENOUS at 08:50

## 2024-01-12 RX ADMIN — Medication 2 G: at 07:23

## 2024-01-12 ASSESSMENT — ACTIVITIES OF DAILY LIVING (ADL)
ADLS_ACUITY_SCORE: 22
ADLS_ACUITY_SCORE: 22
ADLS_ACUITY_SCORE: 20
ADLS_ACUITY_SCORE: 22

## 2024-01-12 ASSESSMENT — ENCOUNTER SYMPTOMS: DYSRHYTHMIAS: 1

## 2024-01-12 NOTE — DISCHARGE INSTRUCTIONS
Patient removes dressing tomorrow morning (ace, fluffs), leave mepilex on until post op visit. OK to shower on post op day 3-covering dressing when showering.

## 2024-01-12 NOTE — OP NOTE
Operative Report    PATIENT:  Tyrone Cohn    DATE OF SURGERY:  1/12/2024    SURGEON  Jose Diamond MD.      FIRST ASSISTANT  Frederic Alejandro PA-C  (Expert PER assist was required throughout for patient positioning, soft tissue retraction, appropriate use of knee instrumentation, and patient safety)     PREOPERATIVE DIAGNOSIS  right knee osteoarthritis     POSTOPERATIVE DIAGNOSIS  right knee osteoarthritis.         PROCEDURE  right Total Knee Arthroplasty.         ANESTHESIA  Spinal    SPECIMENS: none     ESTIMATED BLOOD LOSS  100cc     INDICATIONS  Mr. Tyrone Cohn is a pleasant 73 year old-year-old male with an ongoing history of increasing and progressive pain in the right knee with severe disability. Pain and disability due to knee arthritis are severely affecting quality of life and ability to perform even simple activities of daily living.  X-rays have shown bone-on-bone degenerative change. Consequently after trying and failing all conservative management of knee arthritis, discussion regarding the risk and benefits of knee replacement was undertaken and the patient elected to proceed.     FINDINGS:  The operative knee showed a severe full-thickness cartilage loss on the femur and tibia in the medial compartment of the knee.  The patellofemoral joint also showed advanced arthritic changes.      IMPLANTS  1. Amber, Persona, CR femoral component, size 10 .  2. Amber, Persona, tibial component, size E.  3. Amber, Persona,  all polyethylene articular surface 11 mm thickness.  UC  4. Amber, Persona, all polyethylene patellar button, 38mm diameter      PROCEDURE  Once consent was obtained and the operative site marked in the preop holding area, the patient was brought to the operating room.  Anesthesia was established without difficulty. All bony prominences and the non-operative leg were padded appropriately. The right leg was sterilely prepped and draped in the usual fashion after placement of a  proximal tourniquet.  Tourniquet was never inflated.     A longitudinal incision made over the knee.  Dissection was carried down through the extensor mechanism.  A medial parapatellar arthrotomy  was performed.  The patella was luxed laterally and protected. Standard medial release performed.    An intramedullary guide was used in the femur.  The distal femoral was made at 4 degrees of valgus.  The femoral cutting block  was applied and the rest of the femoral cuts completed. ACL was removed and the PCL was recessed.    Attention was then turned to the tibia.  This was cut using an extramedullary tibial cutting guide perpendicular to its mechanical axis.  The trial tibial and femoral components were then placed and the knee reduced. The patella was resurfaced and found to track well. Flexion and extension gaps were appropriate and varus valgus stability was good.     The knee was copiously irrigated and all bony surfaces dried.  Cement was mixed and all components were cemented and held until firm.  The knee was again trialed.  The  Polyethylene was opened and snapped into place, the locking mechanism was ensured.  The knee was copiously irrigated. The extensor mechanism was closed with # 2 interrupted Vicryl suture and #1 stratafix.. Deep dermis was closed layer-wise of # 2-0 interrupted inverted Vicryl sutures followed by skin closure.  Dressings were applied. The patient tolerated the procedure well and was returned to the postop recovery area in stable condition.          JOSE GUILLAUME MD    @C(1)@  Jose Guillaume MD    @C(2)@  Tonie James

## 2024-01-12 NOTE — PHARMACY-ADMISSION MEDICATION HISTORY
Pharmacist Admission Medication History    Admission medication history is complete. The information provided in this note is only as accurate as the sources available at the time of the update.    Information Source(s): Patient and CareEverywhere/SureScripts via in-person    Pertinent Information:     Allergies reviewed with patient and updates made in EHR: no    Medication History Completed By: Lisa Brown RPH 1/12/2024 7:07 AM    PTA Med List   Medication Sig Last Dose    calcium carbonate 500 mg, elemental, (OSCAL 500) 1250 (500 Ca) MG TABS tablet Take 1 tablet by mouth daily 1/11/2024 at AM    sildenafil (VIAGRA) 100 MG tablet Take 100 mg by mouth daily as needed Unknown

## 2024-01-12 NOTE — ANESTHESIA PROCEDURE NOTES
"Adductor canal Procedure Note    Pre-Procedure   Staff -        Anesthesiologist:  Danny Castellanos MD       Performed By: anesthesiologist       Location: pre-op       Procedure Start/Stop Times: 1/12/2024 7:10 AM and 1/12/2024 7:13 AM       Pre-Anesthestic Checklist: patient identified, IV checked, site marked, risks and benefits discussed, informed consent, monitors and equipment checked, pre-op evaluation, at physician/surgeon's request and post-op pain management  Timeout:       Correct Patient: Yes        Correct Procedure: Yes        Correct Site: Yes        Correct Position: Yes        Correct Laterality: Yes        Site Marked: Yes  Procedure Documentation  Procedure: Adductor canal       Diagnosis: R KNEE PAIN       Laterality: right       Patient Position: supine       Patient Prep/Sterile Barriers: sterile gloves, mask       Skin prep: Chloraprep       Needle Type: insulated       Needle Gauge: 20.        Needle Length (Inches): 4        Ultrasound guided       1. Ultrasound was used to identify targeted nerve, plexus, vascular marker, or fascial plane and place a needle adjacent to it in real-time.       2. Ultrasound was used to visualize the spread of anesthetic in close proximity to the above referenced structure.       3. A permanent image is entered into the patient's record.       4. The visualized anatomic structures appeared normal.       5. There were no apparent abnormal pathologic findings.    Assessment/Narrative         The placement was negative for: blood aspirated, painful injection and site bleeding       Paresthesias: No.       Insertion/Infusion Method: Single Shot       Complications: none    Medication(s) Administered   Bupivacaine 0.5% PF (Infiltration) - Infiltration   15 mL - 1/12/2024 7:10:00 AM  Medication Administration Time: 1/12/2024 7:10 AM      FOR [facility] ([facility]) ONLY:   Pain Team Contact information: please page the Pain Team Via [employer]. Search \"Pain\". During " daytime hours, please page the attending first. At night please page the resident first.

## 2024-01-12 NOTE — ANESTHESIA CARE TRANSFER NOTE
Patient: Tyrone Cohn    Procedure: Procedure(s):  RIGHT TOTAL KNEE ARTHROPLASTY       Diagnosis: Osteoarthritis of right knee [M17.11]  Diagnosis Additional Information: No value filed.    Anesthesia Type:   Spinal     Note:    Oropharynx: oropharynx clear of all foreign objects and spontaneously breathing  Level of Consciousness: awake  Oxygen Supplementation: room air    Independent Airway: airway patency satisfactory and stable  Dentition: dentition unchanged  Vital Signs Stable: post-procedure vital signs reviewed and stable  Report to RN Given: handoff report given  Patient transferred to: PACU    Handoff Report: Identifed the Patient, Identified the Reponsible Provider, Reviewed the pertinent medical history, Discussed the surgical course, Reviewed Intra-OP anesthesia mangement and issues during anesthesia, Set expectations for post-procedure period and Allowed opportunity for questions and acknowledgement of understanding      Vitals:  Vitals Value Taken Time   /55 01/12/24 0910   Temp 36.3  C (97.3  F) 01/12/24 0910   Pulse 62 01/12/24 0910   Resp 10 01/12/24 0910   SpO2 97 % 01/12/24 0910       Electronically Signed By: AMOR ROLLE CRNA  January 12, 2024  9:11 AM

## 2024-01-12 NOTE — OR NURSING
Writer reviewed post op dressing instructions to share with patient:  Patient removes dressing tomorrow morning (ace, fluffs), leave mepilex on until post op visit.  OK to shower on post op day 3-covering dressing when showering.

## 2024-01-12 NOTE — ANESTHESIA POSTPROCEDURE EVALUATION
Patient: Tyrone Cohn    Procedure: Procedure(s):  RIGHT TOTAL KNEE ARTHROPLASTY       Anesthesia Type:  Spinal    Note:  Disposition: Admission   Postop Pain Control: Uneventful            Sign Out: Well controlled pain   PONV: No   Neuro/Psych: Uneventful            Sign Out: Acceptable/Baseline neuro status   Airway/Respiratory: Uneventful            Sign Out: Acceptable/Baseline resp. status   CV/Hemodynamics: Uneventful            Sign Out: Acceptable CV status; No obvious hypovolemia; No obvious fluid overload   Other NRE: NONE   DID A NON-ROUTINE EVENT OCCUR? No           Last vitals:  Vitals Value Taken Time   /59 01/12/24 1020   Temp 36.3  C (97.3  F) 01/12/24 0910   Pulse 54 01/12/24 1029   Resp 26 01/12/24 1029   SpO2 99 % 01/12/24 1029   Vitals shown include unfiled device data.    Electronically Signed By: Danny Castellanos MD  January 12, 2024  1:37 PM

## 2024-01-12 NOTE — ANESTHESIA PREPROCEDURE EVALUATION
Anesthesia Pre-Procedure Evaluation    Patient: Tyrone Cohn   MRN: [id number] : 1950        Procedure : Procedure(s):  RIGHT TOTAL KNEE ARTHROPLASTY          Past Medical History:   Diagnosis Date    Motion sickness     Sleep apnea       Past Surgical History:   Procedure Laterality Date    APPENDECTOMY      BIOPSY SKIN (LOCATION)      COLON SURGERY      REMOVAL OF POLYPS    FINGER SURGERY Left     THUMB, POINTER AND MIDDLE FINGER    FRACTURE SURGERY      HERNIA REPAIR      KNEE SURGERY Bilateral     TO REPAIR CARTILATE    LAPAROTOMY EXPLORATORY N/A 2016    Procedure: EXPLORATORY LAPAROTOMY , LYSIS OF ADHESIONS, APPENDECTOMY;  Surgeon: Dalton Galicia MD;  Location: [facility];  Service:     ORTHOPEDIC SURGERY      OTHER SURGICAL HISTORY Right      BONE SPUR    SPINAL FUSION      C5-C6    TONSILLECTOMY      AND ADENOIDS    UVULOPALATOPHARYNGOPLASTY AND SEPTOPLASTY        Allergies   Allergen Reactions    Wheat Extract GI Disturbance      Social History     Tobacco Use    Smoking status: Former     Packs/day: 1.00     Years: 7.00     Additional pack years: 0.00     Total pack years: 7.00     Types: Cigarettes     Quit date: 1978     Years since quittin.0    Smokeless tobacco: Never   Substance Use Topics    Alcohol use: Yes     Comment: Alcoholic Drinks/day: 3 drinks per week of wine, liquor or beer      Wt Readings from Last 1 Encounters:   24 77.1 kg (170 lb)        Anesthesia Evaluation   Pt has had prior anesthetic.         ROS/MED HX  ENT/Pulmonary:     (+) sleep apnea, uses CPAP,                                      Neurologic:  - neg neurologic ROS     Cardiovascular:     (+)  - -   -  - -                        dysrhythmias, a-fib,             METS/Exercise Tolerance:     Hematologic:       Musculoskeletal:   (+)  arthritis,             GI/Hepatic:  - neg GI/hepatic ROS     Renal/Genitourinary:  - neg Renal ROS     Endo:  - neg endo ROS     Psychiatric/Substance  "Use:       Infectious Disease:  - neg infectious disease ROS     Malignancy:       Other:            Physical Exam    Airway  airway exam normal      Mallampati: I   TM distance: > 3 FB   Neck ROM: full   Mouth opening: > 3 cm    Respiratory Devices and Support         Dental       (+) Minor Abnormalities - some fillings, tiny chips      Cardiovascular   cardiovascular exam normal       Rhythm and rate: regular and normal     Pulmonary   pulmonary exam normal        breath sounds clear to auscultation           OUTSIDE LABS:  CBC:   Lab Results   Component Value Date    HGB 15.0 03/28/2018     BMP:   Lab Results   Component Value Date     01/05/2024     11/17/2022    POTASSIUM 4.3 01/05/2024    POTASSIUM 4.1 11/17/2022    CHLORIDE 105 01/05/2024    CHLORIDE 103 11/17/2022    CO2 28 01/05/2024    CO2 24 11/17/2022    BUN 13.9 01/05/2024    BUN 18.4 11/17/2022    CR 0.91 01/05/2024    CR 0.91 11/17/2022    GLC 96 01/05/2024     (H) 11/17/2022     COAGS: No results found for: \"PTT\", \"INR\", \"FIBR\"  POC: No results found for: \"BGM\", \"HCG\", \"HCGS\"  HEPATIC:   Lab Results   Component Value Date    ALBUMIN 4.3 11/17/2022    PROTTOTAL 6.2 (L) 11/17/2022    ALT 23 11/17/2022    AST 23 11/17/2022    ALKPHOS 86 11/17/2022    BILITOTAL 0.3 11/17/2022     OTHER:   Lab Results   Component Value Date    SAMIR 9.1 01/05/2024    LIPASE 36 11/17/2022    CRP <0.1 11/13/2020       Anesthesia Plan    ASA Status:  3    NPO Status:  NPO Appropriate    Anesthesia Type: Spinal.              Consents    Anesthesia Plan(s) and associated risks, benefits, and realistic alternatives discussed. Questions answered and patient/representative(s) expressed understanding.     - Discussed:     - Discussed with:  Patient, Spouse            Postoperative Care    Pain management: IV analgesics, Peripheral nerve block (Single Shot), Multi-modal analgesia.   PONV prophylaxis: Ondansetron (or other 5HT-3)     Comments:               " Danny Castellanos MD    I have reviewed the pertinent notes and labs in the chart from the past 30 days and (re)examined the patient.  Any updates or changes from those notes are reflected in this note.

## 2024-01-13 NOTE — PROGRESS NOTES
01/12/24 1530   Appointment Info   Signing Clinician's Name / Credentials (PT) Favian Fong, PT, DPT   Quick Adds   Quick Adds Certification   Living Environment   People in Home spouse   Current Living Arrangements house   Home Accessibility stairs to enter home   Number of Stairs, Main Entrance 1   Self-Care   Usual Activity Tolerance good   Current Activity Tolerance moderate   Equipment Currently Used at Home none   Fall history within last six months no   General Information   Onset of Illness/Injury or Date of Surgery 01/12/24   Referring Physician Dr. Diamond   Patient/Family Therapy Goals Statement (PT) Go home   Pertinent History of Current Problem (include personal factors and/or comorbidities that impact the POC) s/p R TKA   Existing Precautions/Restrictions weight bearing   Weight-Bearing Status - LLE full weight-bearing   Weight-Bearing Status - RLE weight-bearing as tolerated   Range of Motion (ROM)   ROM Comment decreased ROM s/p R TKA   Strength (Manual Muscle Testing)   Strength Comments WFL   Bed Mobility   Bed Mobility supine-sit   Supine-Sit [place] (Bed Mobility) supervision   Transfers   Transfers sit-stand transfer   Sit-Stand Transfer   Sit-Stand [place] (Transfers) contact guard   Assistive Device (Sit-Stand Transfers) walker, front-wheeled   Gait/Stairs (Locomotion)   [place] Level (Gait) contact guard   Assistive Device (Gait) walker, front-wheeled   Distance in Feet (Gait) 10'   Pattern (Gait) step-to;step-through   Deviations/Abnormal Patterns (Gait) annie decreased;stride length decreased   Clinical Impression   Criteria for Skilled Therapeutic Intervention Yes, treatment indicated   PT Diagnosis (PT) impaired functional mobility   Influenced by the following impairments pain, decreased ROM, impaired balance, decreased strength   Functional limitations due to impairments gait, stairs, transfers   Clinical Presentation (PT Evaluation Complexity) stable   Clinical  Presentation Rationale pt presents as medically diagnosed   Clinical Decision Making (Complexity) low complexity   Planned Therapy Interventions (PT) balance training;bed mobility training;gait training;home exercise program;patient/family education;ROM (range of motion);stair training;strengthening;transfer training   Risk & Benefits of therapy have been explained care plan/treatment goals reviewed;patient   Physical Therapy Goals   PT Frequency One time eval and treatment only   PT Predicted Duration/Target Date for Goal Attainment 01/13/24   PT Goals PT Goal 1;Gait;Transfers   PT: Transfers Modified independent;Sit to/from stand;Assistive device;Goal Met   PT: Gait Modified independent;Rolling walker;100 feet;Goal Met   PT: Goal 1 Independent with written HEP for LE strengthening and ROM, Goal Met   Interventions   Interventions Quick Adds Therapeutic Procedure;Therapeutic Activity;Gait Training   Therapeutic Procedure/Exercise   Ther. Procedure: strength, endurance, ROM, flexibillity Minutes (74218) 10   Symptoms Noted During/After Treatment fatigue;increased pain   Treatment Detail/Skilled Intervention TKA protocol therex x10 reps, cueing for technique, Independent with written HEP following education   Therapeutic Activity   Therapeutic Activities: dynamic activities to improve functional performance Minutes (42274) 10   Treatment Detail/Skilled Intervention Spine to sit and sit to/from stand SBA, cueing for safe hand placement and LE positioning, Mod I following education. Educated on pain control, icing, role of therapies, POC.   Gait Training   Gait Training Minutes (32248) 5   Symptoms Noted During/After Treatment (Gait Training) fatigue;increased pain   Treatment Detail/Skilled Intervention Pt amb well in the halls CGA with FWW, educated on pacing, walking program, no LOB or adverse s/s.   Distance in Feet 100'   [place] Level (Gait Training) contact guard   Physical Assistance Level (Gait Training)  verbal cues;supervision   Weight Bearing (Gait Training) weight-bearing as tolerated   Assistive Device (Gait Training) rolling walker   Pattern Analysis (Gait Training) swing-through gait   Gait Analysis Deviations decreased annie;decreased step length   Impairments (Gait Analysis/Training) pain   PT Discharge Planning   PT Plan d/c PT   PT Discharge Recommendation (DC Rec) other (see comments)   PT Rationale for DC Rec defer to ortho   PT Brief overview of current status SBA mobility   PT Equipment Needed at Discharge   (Has equipment)   [facility]  OUTPATIENT PHYSICAL THERAPY EVALUATION  PLAN OF TREATMENT FOR OUTPATIENT REHABILITATION  (COMPLETE FOR INITIAL CLAIMS ONLY)  Patient's Last Name, First Name, M.I.  YOB: 1950  Tyrone Cohn                        Provider's Name  [facility] Medical Record No.  [id number]                             Onset Date:  01/12/24   Start of Care Date:      Type:     _X_PT   ___OT   ___SLP Medical Diagnosis:                 PT Diagnosis:  impaired functional mobility Visits from SOC:  1     See note for plan of treatment, functional goals and certification details    I CERTIFY THE NEED FOR THESE SERVICES FURNISHED UNDER        THIS PLAN OF TREATMENT AND WHILE UNDER MY CARE     (Physician co-signature of this document indicates review and certification of the therapy plan).

## 2024-03-24 ENCOUNTER — HEALTH MAINTENANCE LETTER (OUTPATIENT)
Age: 74
End: 2024-03-24

## 2024-03-28 ENCOUNTER — TELEPHONE (OUTPATIENT)
Dept: AUDIOLOGY | Facility: CLINIC | Age: 74
End: 2024-03-28
Payer: COMMERCIAL

## 2024-03-28 NOTE — TELEPHONE ENCOUNTER
LVM indicating his last audiogram on 11/27/2023 showed normal hearing through 1500 Hz sloping to moderately severe sensorineural hearing loss for the right ear and normal hearing through 1500 Hz sloping to severe sensorineural hearing loss for the left ear    Storm Concepcion, [facility]  Licensed Audiologist  [place] #[id number]    [facility] Call Center    Phone Message    May a detailed message be left on voicemail: yes     Reason for Call: Other: pt is signing up for a research project and has questions about his hearing levels for the form.  Please call him back - [facility] location, Thanks     Action Taken: Other: AUD    Travel Screening: Not Applicable

## 2025-01-09 ENCOUNTER — LAB REQUISITION (OUTPATIENT)
Dept: LAB | Facility: CLINIC | Age: 75
End: 2025-01-09
Payer: COMMERCIAL

## 2025-01-09 DIAGNOSIS — N40.1 BENIGN PROSTATIC HYPERPLASIA WITH LOWER URINARY TRACT SYMPTOMS: ICD-10-CM

## 2025-01-09 LAB — PSA SERPL DL<=0.01 NG/ML-MCNC: 1.4 NG/ML (ref 0–6.5)

## 2025-01-09 PROCEDURE — G0103 PSA SCREENING: HCPCS | Mod: ORL | Performed by: FAMILY MEDICINE

## 2025-04-11 ENCOUNTER — TRANSFERRED RECORDS (OUTPATIENT)
Dept: HEALTH INFORMATION MANAGEMENT | Facility: CLINIC | Age: 75
End: 2025-04-11

## 2025-04-11 ENCOUNTER — HOSPITAL ENCOUNTER (EMERGENCY)
Facility: CLINIC | Age: 75
Discharge: HOME | End: 2025-04-11
Attending: EMERGENCY MEDICINE | Admitting: EMERGENCY MEDICINE
Payer: COMMERCIAL

## 2025-04-11 VITALS
HEIGHT: 75 IN | HEART RATE: 67 BPM | WEIGHT: 170 LBS | BODY MASS INDEX: 21.14 KG/M2 | OXYGEN SATURATION: 98 % | RESPIRATION RATE: 16 BRPM | TEMPERATURE: 97.7 F | SYSTOLIC BLOOD PRESSURE: 139 MMHG | DIASTOLIC BLOOD PRESSURE: 77 MMHG

## 2025-04-11 DIAGNOSIS — R00.0 TACHYCARDIA: ICD-10-CM

## 2025-04-11 LAB
ANION GAP SERPL CALCULATED.3IONS-SCNC: 12 MMOL/L (ref 7–15)
APTT PPP: 27 SECONDS (ref 22–38)
ATRIAL RATE - MUSE: 144 BPM
ATRIAL RATE - MUSE: 68 BPM
BASOPHILS # BLD AUTO: 0 10E3/UL (ref 0–0.2)
BASOPHILS NFR BLD AUTO: 0 %
BUN SERPL-MCNC: 16.7 MG/DL (ref 8–23)
CALCIUM SERPL-MCNC: 10.2 MG/DL (ref 8.8–10.4)
CHLORIDE SERPL-SCNC: 102 MMOL/L (ref 98–107)
CREAT SERPL-MCNC: 0.87 MG/DL (ref 0.67–1.17)
DIASTOLIC BLOOD PRESSURE - MUSE: 74 MMHG
DIASTOLIC BLOOD PRESSURE - MUSE: NORMAL MMHG
EGFRCR SERPLBLD CKD-EPI 2021: >90 ML/MIN/1.73M2
EOSINOPHIL # BLD AUTO: 0.1 10E3/UL (ref 0–0.7)
EOSINOPHIL NFR BLD AUTO: 1 %
ERYTHROCYTE [DISTWIDTH] IN BLOOD BY AUTOMATED COUNT: 12.3 % (ref 10–15)
GLUCOSE SERPL-MCNC: 100 MG/DL (ref 70–99)
HCO3 SERPL-SCNC: 26 MMOL/L (ref 22–29)
HCT VFR BLD AUTO: 47.4 % (ref 40–53)
HGB BLD-MCNC: 16.1 G/DL (ref 13.3–17.7)
HOLD SPECIMEN: NORMAL
IMM GRANULOCYTES # BLD: 0 10E3/UL
IMM GRANULOCYTES NFR BLD: 0 %
INR PPP: 0.95 (ref 0.85–1.15)
INTERPRETATION ECG - MUSE: NORMAL
INTERPRETATION ECG - MUSE: NORMAL
LYMPHOCYTES # BLD AUTO: 1.8 10E3/UL (ref 0.8–5.3)
LYMPHOCYTES NFR BLD AUTO: 24 %
MAGNESIUM SERPL-MCNC: 2.2 MG/DL (ref 1.7–2.3)
MCH RBC QN AUTO: 31.6 PG (ref 26.5–33)
MCHC RBC AUTO-ENTMCNC: 34 G/DL (ref 31.5–36.5)
MCV RBC AUTO: 93 FL (ref 78–100)
MONOCYTES # BLD AUTO: 0.5 10E3/UL (ref 0–1.3)
MONOCYTES NFR BLD AUTO: 7 %
NEUTROPHILS # BLD AUTO: 5.1 10E3/UL (ref 1.6–8.3)
NEUTROPHILS NFR BLD AUTO: 67 %
NRBC # BLD AUTO: 0 10E3/UL
NRBC BLD AUTO-RTO: 0 /100
P AXIS - MUSE: 82 DEGREES
P AXIS - MUSE: 84 DEGREES
PLATELET # BLD AUTO: 216 10E3/UL (ref 150–450)
POTASSIUM SERPL-SCNC: 4.3 MMOL/L (ref 3.4–5.3)
PR INTERVAL - MUSE: 154 MS
PR INTERVAL - MUSE: 228 MS
QRS DURATION - MUSE: 108 MS
QRS DURATION - MUSE: 94 MS
QT - MUSE: 252 MS
QT - MUSE: 370 MS
QTC - MUSE: 390 MS
QTC - MUSE: 393 MS
R AXIS - MUSE: 51 DEGREES
R AXIS - MUSE: 61 DEGREES
RBC # BLD AUTO: 5.09 10E6/UL (ref 4.4–5.9)
SODIUM SERPL-SCNC: 140 MMOL/L (ref 135–145)
SYSTOLIC BLOOD PRESSURE - MUSE: 114 MMHG
SYSTOLIC BLOOD PRESSURE - MUSE: NORMAL MMHG
T AXIS - MUSE: 71 DEGREES
T AXIS - MUSE: 89 DEGREES
TROPONIN T SERPL HS-MCNC: 10 NG/L
TROPONIN T SERPL HS-MCNC: 11 NG/L
TSH SERPL DL<=0.005 MIU/L-ACNC: 1.59 UIU/ML (ref 0.3–4.2)
VENTRICULAR RATE- MUSE: 144 BPM
VENTRICULAR RATE- MUSE: 68 BPM
WBC # BLD AUTO: 7.5 10E3/UL (ref 4–11)

## 2025-04-11 PROCEDURE — 85004 AUTOMATED DIFF WBC COUNT: CPT | Performed by: EMERGENCY MEDICINE

## 2025-04-11 PROCEDURE — 36415 COLL VENOUS BLD VENIPUNCTURE: CPT | Performed by: EMERGENCY MEDICINE

## 2025-04-11 PROCEDURE — 80048 BASIC METABOLIC PNL TOTAL CA: CPT | Performed by: EMERGENCY MEDICINE

## 2025-04-11 PROCEDURE — 84443 ASSAY THYROID STIM HORMONE: CPT | Performed by: EMERGENCY MEDICINE

## 2025-04-11 PROCEDURE — 99285 EMERGENCY DEPT VISIT HI MDM: CPT

## 2025-04-11 PROCEDURE — 85610 PROTHROMBIN TIME: CPT | Performed by: EMERGENCY MEDICINE

## 2025-04-11 PROCEDURE — 83735 ASSAY OF MAGNESIUM: CPT | Performed by: EMERGENCY MEDICINE

## 2025-04-11 PROCEDURE — 93005 ELECTROCARDIOGRAM TRACING: CPT | Performed by: EMERGENCY MEDICINE

## 2025-04-11 PROCEDURE — 85730 THROMBOPLASTIN TIME PARTIAL: CPT | Performed by: EMERGENCY MEDICINE

## 2025-04-11 PROCEDURE — 84484 ASSAY OF TROPONIN QUANT: CPT | Performed by: EMERGENCY MEDICINE

## 2025-04-11 PROCEDURE — 85041 AUTOMATED RBC COUNT: CPT | Performed by: EMERGENCY MEDICINE

## 2025-04-11 RX ORDER — METOPROLOL TARTRATE 25 MG/1
25 TABLET, FILM COATED ORAL 2 TIMES DAILY
Qty: 60 TABLET | Refills: 0 | Status: SHIPPED | OUTPATIENT
Start: 2025-04-11 | End: 2025-04-11

## 2025-04-11 RX ORDER — METOPROLOL TARTRATE 25 MG/1
25 TABLET, FILM COATED ORAL DAILY
Qty: 30 TABLET | Refills: 0 | Status: SHIPPED | OUTPATIENT
Start: 2025-04-11 | End: 2025-04-14 | Stop reason: ALTCHOICE

## 2025-04-11 ASSESSMENT — ENCOUNTER SYMPTOMS
ABDOMINAL PAIN: 0
DIARRHEA: 0
PALPITATIONS: 1
FATIGUE: 1
DIZZINESS: 0
VOMITING: 0
LIGHT-HEADEDNESS: 1
NAUSEA: 0
SHORTNESS OF BREATH: 0

## 2025-04-11 ASSESSMENT — ACTIVITIES OF DAILY LIVING (ADL)
ADLS_ACUITY_SCORE: 41

## 2025-04-11 ASSESSMENT — COLUMBIA-SUICIDE SEVERITY RATING SCALE - C-SSRS
2. HAVE YOU ACTUALLY HAD ANY THOUGHTS OF KILLING YOURSELF IN THE PAST MONTH?: NO
6. HAVE YOU EVER DONE ANYTHING, STARTED TO DO ANYTHING, OR PREPARED TO DO ANYTHING TO END YOUR LIFE?: NO
1. IN THE PAST MONTH, HAVE YOU WISHED YOU WERE DEAD OR WISHED YOU COULD GO TO SLEEP AND NOT WAKE UP?: NO

## 2025-04-11 NOTE — DISCHARGE INSTRUCTIONS
Stop taking any aspirin products until you talk to the cardiologist (due to now starting the Eliquis blood thinner).    I suspect that you are going in and out of atrial fibrillation again (also called A-fib).  Call and make an appointment to follow-up in the cardiology clinic for next week.  I have spoken with Dr. Michelle of the cardiology team.    Take the metoprolol 25 mg daily.  This is to keep your heart rate a little slower and hopefully keep you out of A-fib and fast A-fib rhythms.    Take the Eliquis blood thinner as directed to help decrease your risk of stroke from A-fib.    Return the emergency department if you develop worsening dizziness or palpitations, chest pain, difficulty breathing, feeling like you could pass out, or any other concerns.    Thank you for choosing [facility] Emergency Department.  It has been my pleasure caring for you today.     ~Dr. Goyo MD

## 2025-04-11 NOTE — ED TRIAGE NOTES
"Pt c/o feeling an irregular heart beat for several days. Endorses some SOB and lightheadedness with this. Endorses hx afib \"years and years ago\" that he states was \"cured\" with cpap for sleep apnea. Not anticoagulated.     Triage Assessment (Adult)       Row Name 04/11/25 1254          Triage Assessment    Airway WDL WDL        Respiratory WDL    Respiratory WDL X;rhythm/pattern     Rhythm/Pattern, Respiratory shortness of breath        Skin Circulation/Temperature WDL    Skin Circulation/Temperature WDL WDL        Cardiac WDL    Cardiac WDL X;rhythm     Pulse Rate & Regularity tachycardic;apical pulse irregular     Cardiac Rhythm Atrial fibrillation        Peripheral/Neurovascular WDL    Peripheral Neurovascular WDL WDL        Cognitive/Neuro/Behavioral WDL    Cognitive/Neuro/Behavioral WDL WDL                     "

## 2025-04-11 NOTE — ED PROVIDER NOTES
"    EMERGENCY DEPARTMENT ENCOUNTER      NAME: Tyrone Cohn  AGE: 75 year old male  YOB: 1950  MRN: [id number]  EVALUATION DATE & TIME: 4/11/2025 12:44 PM    PCP: Hardik Rouse    ED PROVIDER: Kelly Nance M.D.        Chief Complaint   Patient presents with    Irregular Heart Beat         FINAL IMPRESSION:    1. Tachycardia        MEDICAL DECISION MAKING:    Tyrone Cohn is a 75 year old male with history of atrial fibrillation approximately 25 years ago that resolved after initiation of CPAP machine for sleep apnea who presents to the ER with complaints of concerns for recurrent A-fib.    Patient has had intermittent episodes over the past week or so of some is having some lightheadedness and other times just feeling a little off.  Checked his pulse and at times he could feel skipping beats.    When he presented to the ED today he had a heart rate in the 140s which could represent an A-fib versus a flutter.  He spontaneously converted on his own.  Laboratories reassuring.  He is now asymptomatic.  When he arrived that heart rate in the 140 he did feel just a little \"different \"but denied any actual chest pain, dizziness, shortness of breath, near-syncope, etc.    Plan at this time is discharge home after discussion with cardiology.  He will go home with a prescription for metoprolol tartrate 25 mg daily, Eliquis and follow-up with cardiology as an outpatient.  Patient aware of this plan and agrees.      ED COURSE:  1:08 PM  I met with the patient to gather history and perform my exam. ED course and treatment discussed.  Discussed with patient the plan to do rate control medications at this time.  He has been having intermittent symptoms now for at least a week and therefore not an emergent cardioversion candidate due to risk for embolic stroke.    1:23 PM  Looking at the cardiac monitor heart rate is now in the 60s and appears to be sinus rhythm on the monitor.  Will have nursing grab " another EKG.  Spoke with the patient and he continues to remain asymptomatic.  He converted without any medications or intervention.    1:29 PM  Repeat EKG is now sinus rhythm.    2:10 PM  I was able to speak with cardiology and they feel that he can follow-up with general cardiology as an outpatient.  They would like him started on metoprolol tartrate 25 mg daily and anticoagulation such as Eliquis or Xarelto.    3:34 PM  Repeat troponin is stable.  This time I feel he is appropriate for discharge home.  I spoke with patient about what to watch for and when to return to the ER.  He is aware the plan to do metoprolol and Eliquis.  He will follow-up with cardiology in clinic.    I considered rib fractures, myocarditis, pericarditis, endocarditis, ACS, PE, ruptured AAA, pneumothorax, aortic dissection, bowel obstruction, bowel ischemia, cholecystitis, pancreatitis, diverticulitis, kidney stone, pyelonephritis, incarcerated or strangulated hernia, viscus perforation, perforated GI ulcer, and other such etiologies at this time.       At this time I feel that this patient can be discharged from the emergency department and can followup as directed.      At the conclusion of the encounter I discussed the results of all of the tests and the disposition. Their questions were answered. The patient (and any family present) acknowledged understanding and were agreeable with the care plan.        CONSULTANTS:  Cardiology - Dr. Michelle        MEDICATIONS GIVEN IN THE EMERGENCY:  Medications - No data to display        NEW PRESCRIPTIONS STARTED AT TODAY'S ER VISIT     Medication List        Started      apixaban ANTICOAGULANT 5 MG tablet  Commonly known as: ELIQUIS ANTICOAGULANT  5 mg, Oral, 2 TIMES DAILY     metoprolol tartrate 25 MG tablet  Commonly known as: LOPRESSOR  25 mg, Oral, DAILY                CONDITION:  Stable, improved        DISPOSITION:  D.c home    "    =================================================================  =================================================================  TRIAGE ASSESSMENT:  Pt c/o feeling an irregular heart beat for several days. Endorses some SOB and lightheadedness with this. Endorses hx afib \"years and years ago\" that he states was \"cured\" with cpap for sleep apnea. Not anticoagulated.     Triage Assessment (Adult)       Row Name 04/11/25 1252          Triage Assessment    Airway WDL WDL        Respiratory WDL    Respiratory WDL X;rhythm/pattern     Rhythm/Pattern, Respiratory shortness of breath        Skin Circulation/Temperature WDL    Skin Circulation/Temperature WDL WDL        Cardiac WDL    Cardiac WDL X;rhythm     Pulse Rate & Regularity tachycardic;apical pulse irregular     Cardiac Rhythm Atrial fibrillation        Peripheral/Neurovascular WDL    Peripheral Neurovascular WDL WDL        Cognitive/Neuro/Behavioral WDL    Cognitive/Neuro/Behavioral WDL WDL                          ED Triage Vitals [04/11/25 1251]   Encounter Vitals Group      /74      Systolic BP Percentile       Diastolic BP Percentile       Pulse (!) 147      Resp 20      Temp 98.3  F (36.8  C)      Temp src Oral      SpO2 99 %      Weight 77.1 kg (170 lb)      Height 1.905 m (6' 3\")         ================================================================  ================================================================    HPI    Patient information was obtained from: patient    Use of Intrepreter: N/A     Tyrone Cohn is a 75 year old male with history of atrial fibrillation approximately 25 years ago that resolved after initiation of CPAP machine for sleep apnea who presents to the ER with complaints of concerns for recurrent A-fib.    Patient states that last week already he started noticing that he would feel little lightheaded at times and just not feeling normal and he noted skipped beats on palpation of his pulse.  He never checked to see " whether or not he was having a fast heart rate at any time, the 1 time he checked his pulse was irregular it seemed to be normal at that time.    Today he was just feeling little off without any headache, chest pain, shortness of breath, abdominal pain, near syncope, room spinning, vomiting or diarrhea type symptoms.    He is not on any anticoagulation.  He is not on any daily medications.  He has not seen a cardiologist in 20+ years.      CHART REVIEW:  Chart review shows a office visit back in March 2020 for the does mention his A-fib and relation with sleep apnea.      REVIEW OF SYSTEMS  Review of Systems   Constitutional:  Positive for fatigue.   Respiratory:  Negative for shortness of breath.    Cardiovascular:  Positive for palpitations. Negative for chest pain.   Gastrointestinal:  Negative for abdominal pain, diarrhea, nausea and vomiting.   Neurological:  Positive for light-headedness. Negative for dizziness and syncope.         PAST MEDICAL HISTORY:  Past Medical History:   Diagnosis Date    Motion sickness     Sleep apnea          PAST SURGICAL HISTORY:  Past Surgical History:   Procedure Laterality Date    APPENDECTOMY      ARTHROPLASTY KNEE Right 1/12/2024    Procedure: RIGHT TOTAL KNEE ARTHROPLASTY;  Surgeon: Jose Diamond MD;  Location: [facility]    BIOPSY SKIN (LOCATION)      COLON SURGERY      REMOVAL OF POLYPS    FINGER SURGERY Left     THUMB, POINTER AND MIDDLE FINGER    FRACTURE SURGERY      HERNIA REPAIR      KNEE SURGERY Bilateral     TO REPAIR CARTILATE    LAPAROTOMY EXPLORATORY N/A 02/19/2016    Procedure: EXPLORATORY LAPAROTOMY , LYSIS OF ADHESIONS, APPENDECTOMY;  Surgeon: Dalton Galicia MD;  Location: [facility];  Service:     ORTHOPEDIC SURGERY      OTHER SURGICAL HISTORY Right      BONE SPUR    SPINAL FUSION      C5-C6    TONSILLECTOMY      AND ADENOIDS    UVULOPALATOPHARYNGOPLASTY AND SEPTOPLASTY           CURRENT MEDICATIONS:    Prior to Admission medications     Medication Sig Start Date End Date Taking? Authorizing Provider   acetaminophen (TYLENOL) 325 MG tablet Take 2 tablets (650 mg) by mouth every 4 hours as needed for other (mild pain) 24   Jose Diamond MD   aspirin 81 MG EC tablet Take 1 tablet (81 mg) by mouth 2 times daily 24   Jose Diamond MD   calcium carbonate 500 mg, elemental, (OSCAL 500) 1250 (500 Ca) MG TABS tablet Take 1 tablet by mouth daily    Unknown, Entered By History   oxyCODONE (ROXICODONE) 5 MG tablet Take 1-2 tablets (5-10 mg) by mouth every 4 hours as needed for moderate to severe pain 24   Jose Diamond MD   senna-docusate (SENOKOT-S/PERICOLACE) 8.6-50 MG tablet Take 1-2 tablets by mouth 2 times daily Take while on oral narcotics to prevent or treat constipation. 24   Jose Diamond MD   sildenafil (VIAGRA) 100 MG tablet Take 100 mg by mouth daily as needed    Unknown, Entered By History         ALLERGIES:  Allergies   Allergen Reactions    Wheat Extract GI Disturbance         FAMILY HISTORY:  Family History   Problem Relation Age of Onset    Dementia Mother     Heart Disease Mother     Hypertension Mother     Emphysema Father     Arthritis Father     Hypertension Sister     Arthritis Sister     Cancer Brother          SOCIAL HISTORY:  Social History     Socioeconomic History    Marital status:    Tobacco Use    Smoking status: Former     Current packs/day: 0.00     Average packs/day: 1 pack/day for 7.0 years (7.0 ttl pk-yrs)     Types: Cigarettes     Start date: 1971     Quit date: 1978     Years since quittin.3    Smokeless tobacco: Never   Substance and Sexual Activity    Alcohol use: Yes     Comment: Alcoholic Drinks/day: 3 drinks per week of wine, liquor or beer    Drug use: No   Social History Narrative    Patient arrived with a male family member 19       Social Drivers of Health     Food Insecurity: No Food Insecurity (3/18/2024)    Received from [facility]  "Clinic    Hunger Vital Sign     Worried About Running Out of Food in the Last Year: Never true     Ran Out of Food in the Last Year: Never true   Transportation Needs: No Transportation Needs (3/18/2024)    Received from [facility]    PRAPARE - Transportation     Lack of Transportation (Medical): No     Lack of Transportation (Non-Medical): No   Physical Activity: Sufficiently Active (3/18/2024)    Received from [facility]    Exercise Vital Sign     Days of Exercise per Week: 5 days     Minutes of Exercise per Session: 30 min   Housing Stability: Low Risk  (3/18/2024)    [facility]    Housing Stability     What is your living situation today?: I have a steady place to live         VITALS:  Patient Vitals for the past 24 hrs:   BP Temp Temp src Pulse Resp SpO2 Height Weight   04/11/25 1500 125/68 97.9  F (36.6  C) Oral 67 25 100 % -- --   04/11/25 1410 -- -- -- 68 -- -- -- --   04/11/25 1334 -- -- -- 77 -- -- -- --   04/11/25 1324 -- -- -- 68 -- -- -- --   04/11/25 1251 114/74 98.3  F (36.8  C) Oral (!) 147 20 99 % 1.905 m (6' 3\") 77.1 kg (170 lb)       Wt Readings from Last 3 Encounters:   04/11/25 77.1 kg (170 lb)   01/12/24 77.1 kg (170 lb)   02/22/16 77 kg (169 lb 11.2 oz)       Estimated Creatinine Clearance: 82.5 mL/min (based on SCr of 0.87 mg/dL).    PHYSICAL EXAM    Constitutional:  Well developed, Well nourished, NAD  HENT:  Normocephalic, Atraumatic, Bilateral external ears normal, Nose normal. Neck- Supple, No stridor.   Eyes:  PERRL, EOMI, Conjunctiva normal, No discharge.  Respiratory:  Normal breath sounds, No respiratory distress, No wheezing, Speaks full sentences easily. No cough.   Cardiovascular:  tachy heart rate, Regular rhythm, No murmurs , No rubs, No gallops.   GI:  No excessive obesity.  Bowel sounds normal, Soft, No tenderness, No masses, No flank tenderness. No rebound or guarding.   : deferred  Musculoskeletal: 2+ DP pulses. No edema.  No " cyanosis, No clubbing. Good range of motion in all major joints. No major deformities noted.   Integument:  Warm, Dry, No erythema, No rash.  No petechiae.   Neurologic:  Alert & oriented x 3, Grossly normal motor function, Grossly normal sensory function, No focal deficits noted.   Psychiatric:  Affect normal, Cooperative         LAB:  All pertinent labs reviewed and interpreted.  Recent Results (from the past 24 hours)   Basic metabolic panel    Collection Time: 04/11/25  1:21 PM   Result Value Ref Range    Sodium 140 135 - 145 mmol/L    Potassium 4.3 3.4 - 5.3 mmol/L    Chloride 102 98 - 107 mmol/L    Carbon Dioxide (CO2) 26 22 - 29 mmol/L    Anion Gap 12 7 - 15 mmol/L    Urea Nitrogen 16.7 8.0 - 23.0 mg/dL    Creatinine 0.87 0.67 - 1.17 mg/dL    GFR Estimate >90 >60 mL/min/1.73m2    Calcium 10.2 8.8 - 10.4 mg/dL    Glucose 100 (H) 70 - 99 mg/dL   Magnesium    Collection Time: 04/11/25  1:21 PM   Result Value Ref Range    Magnesium 2.2 1.7 - 2.3 mg/dL   TSH with free T4 reflex    Collection Time: 04/11/25  1:21 PM   Result Value Ref Range    TSH 1.59 0.30 - 4.20 uIU/mL   INR    Collection Time: 04/11/25  1:21 PM   Result Value Ref Range    INR 0.95 0.85 - 1.15   PTT    Collection Time: 04/11/25  1:21 PM   Result Value Ref Range    aPTT 27 22 - 38 Seconds   Troponin T, High Sensitivity    Collection Time: 04/11/25  1:21 PM   Result Value Ref Range    Troponin T, High Sensitivity 11 <=22 ng/L   Extra Red Top Tube    Collection Time: 04/11/25  1:21 PM   Result Value Ref Range    Hold Specimen JIC    CBC with platelets and differential    Collection Time: 04/11/25  1:21 PM   Result Value Ref Range    WBC Count 7.5 4.0 - 11.0 10e3/uL    RBC Count 5.09 4.40 - 5.90 10e6/uL    Hemoglobin 16.1 13.3 - 17.7 g/dL    Hematocrit 47.4 40.0 - 53.0 %    MCV 93 78 - 100 fL    MCH 31.6 26.5 - 33.0 pg    MCHC 34.0 31.5 - 36.5 g/dL    RDW 12.3 10.0 - 15.0 %    Platelet Count 216 150 - 450 10e3/uL    % Neutrophils 67 %    %  "Lymphocytes 24 %    % Monocytes 7 %    % Eosinophils 1 %    % Basophils 0 %    % Immature Granulocytes 0 %    NRBCs per 100 WBC 0 <1 /100    Absolute Neutrophils 5.1 1.6 - 8.3 10e3/uL    Absolute Lymphocytes 1.8 0.8 - 5.3 10e3/uL    Absolute Monocytes 0.5 0.0 - 1.3 10e3/uL    Absolute Eosinophils 0.1 0.0 - 0.7 10e3/uL    Absolute Basophils 0.0 0.0 - 0.2 10e3/uL    Absolute Immature Granulocytes 0.0 <=0.4 10e3/uL    Absolute NRBCs 0.0 10e3/uL   ECG 12-LEAD WITH MUSE (LHE)    Collection Time: 04/11/25  1:24 PM   Result Value Ref Range    Systolic Blood Pressure  mmHg    Diastolic Blood Pressure  mmHg    Ventricular Rate 68 BPM    Atrial Rate 68 BPM    AR Interval 228 ms    QRS Duration 108 ms     ms    QTc 393 ms    P Axis 82 degrees    R AXIS 61 degrees    T Axis 71 degrees    Interpretation ECG       Sinus rhythm with 1st degree A-V block  Right atrial enlargement  Incomplete right bundle branch block  Borderline ECG  When compared with ECG of 11-Apr-2025 12:55,  AR interval has increased  Vent. rate has decreased by  76 bpm  ST no longer elevated in Inferior leads  Non-specific change in ST segment in Lateral leads  Confirmed by SEE ED PROVIDER NOTE FOR, ECG INTERPRETATION ([id number]),  Jose Manuel Carlson ([id number]) on 4/11/2025 1:30:08 PM     Troponin T, High Sensitivity    Collection Time: 04/11/25  3:04 PM   Result Value Ref Range    Troponin T, High Sensitivity 10 <=22 ng/L       No results found for: \"ABORH\"        RADIOLOGY:  Reviewed all pertinent imaging. Please see official radiology report.    No orders to display         EKG:    Indication: palpitations  Performed at: 12:55p  Impression: Complex tachycardia at 144 bpm.  I suspect an atrial flutter type picture.  No ST elevation appreciated.  Electronic reading indicates acute MI but I do not see any ST elevations.  Nothing to suggest acute MI at this time based on EKG findings.  QRS 94 ms, QTc 3 or 90 ms.  This has replaced sinus rhythm on EKG dated back " to February 19, 2016.    Indication: rhythm change  Performed at: 13:24p  Impression: Sinus rhythm at 68 bpm.  No ST elevation appreciated.  Flipped T waves noted lead aVR, aVL, and V1.  NJ interval 228 ms consistent with first-degree AV block.   ms, QTc 393 ms.  Sinus rhythm has replaced tachycardia from the EKG done in the ER just 30 minutes ago.    I have independently reviewed and interpreted the EKG(s) documented above.        PROCEDURES:  none    Medical Decision Making  I reviewed the EMR: Outpatient Record: see HPI  I discussed the care with another health care provider: Cardiology  Discharge. I prescribed additional prescription strength medication(s) as charted. I considered admission, but ultimately discharged patient patient spontaneously converted and is now asymptomatic.  Laboratories reassuring. Case was discussed with cardiology..    MIPS (CTPE, Dental pain, Leslie, Sinusitis, Asthma/COPD, Head Trauma): Not Applicable    SEPSIS: None      Kelly Nance M.D. [facility]  Emergency Medicine and Medical Toxicology  Formerly [facility] EMERGENCY ROOM  [address] [place] [address] [phone]  Dept: [phone]           Kelly Nance MD  04/11/25 [id number]

## 2025-04-11 NOTE — ED NOTES
Patient Discharged to home. Discharge instructions reviewed and signed by the patient. All personal belongings removed from the room.   David Cronin RN  4/11/2025  3:54 PM

## 2025-04-13 ENCOUNTER — HEALTH MAINTENANCE LETTER (OUTPATIENT)
Age: 75
End: 2025-04-13

## 2025-04-14 ENCOUNTER — OFFICE VISIT (OUTPATIENT)
Dept: CARDIOLOGY | Facility: CLINIC | Age: 75
End: 2025-04-14
Attending: EMERGENCY MEDICINE
Payer: COMMERCIAL

## 2025-04-14 VITALS
HEIGHT: 76 IN | SYSTOLIC BLOOD PRESSURE: 120 MMHG | DIASTOLIC BLOOD PRESSURE: 66 MMHG | HEART RATE: 65 BPM | WEIGHT: 176 LBS | RESPIRATION RATE: 16 BRPM | BODY MASS INDEX: 21.43 KG/M2

## 2025-04-14 DIAGNOSIS — I47.10 PAROXYSMAL SUPRAVENTRICULAR TACHYCARDIA: ICD-10-CM

## 2025-04-14 PROCEDURE — 3074F SYST BP LT 130 MM HG: CPT | Performed by: INTERNAL MEDICINE

## 2025-04-14 PROCEDURE — 99204 OFFICE O/P NEW MOD 45 MIN: CPT | Performed by: INTERNAL MEDICINE

## 2025-04-14 PROCEDURE — 3078F DIAST BP <80 MM HG: CPT | Performed by: INTERNAL MEDICINE

## 2025-04-14 RX ORDER — METOPROLOL SUCCINATE 25 MG/1
25 TABLET, EXTENDED RELEASE ORAL DAILY
Qty: 30 TABLET | Refills: 3 | Status: SHIPPED | OUTPATIENT
Start: 2025-04-14

## 2025-04-14 NOTE — PROGRESS NOTES
"  HEART CARE ENCOUNTER NOTE      [facility]  [phone]      Assessment/Recommendations   Assessment:   1.  Paroxysmal tachycardia episodes, probably atrial flutter with rapid response.  He is minimally symptomatic with these episodes.      Plan:   1.  Continue Eliquis 5 mg twice daily  2.  Stop metoprolol tartrate and begin metoprolol succinate 25 mg/day  3.  Will get a 14-day ZIO monitor to assess efficacy of metoprolol in suppressing his arrhythmia.  4.  Continue BiPAP titration per sleep apnea provider provider  5.  Reduce caffeine intake.    The longitudinal plan of care for the diagnosis(es)/condition(s) as documented were addressed during this visit. Due to the added complexity in care, I will continue to support Giovanni in the subsequent management and with ongoing continuity of care.         History of Present Illness/Subjective    HPI: Tyrone Cohn is a 75 year old male with a history of paroxysms of atrial fibrillation 20 years ago.  At that time he was found to have sleep apnea and was started on CPAP therapy.  After that he had no recurrence of any sensation of palpitations until recently when he began to have recurrent bursts of rapid heart rate which cause a sensation of \"wooziness\" or mild lightheadedness but no pre-syncope.  He denies any chest pain or shortness of breath.  He was seen in the emergency department 3 days ago.  Electrocardiogram showed narrow complex SVT at a rate of 150, possible atrial flutter with 2-1 conduction.  He was started on Eliquis and metoprolol tartrate once per day and referred to rapid access clinic for follow-up.  Since then he has not had recurrence of the tachycardia and feels well.  Has no current symptoms.  He has been having trouble getting his new CPAP device regulated and thinks this may have something to do with the recurrence of his arrhythmias.  He is hoping that once his device is reprogrammed that it will prevent recurrence.  He is quite " "active physically.  He is retired  who used to work for [employer].  He worked on [employer] primarily.  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  There is no history of ischemic heart disease.  He had a stress MPI about 5 years ago which was normal.  He is not a smoker and has no persistent hypertension diabetes or hyperlipidemia.  Family history is noncontributory.  He does not take any medications regularly prior to his recent ER visit.    Studies reviewed:  EC2025: ST/SVT rate 144, incomplete right bundle branch block, nonspecific ST-T wave changes.  Second ECG 2025: NSR rate 68, incomplete right bundle branch block otherwise normal  Stress MPI 2019:  The exercise nuclear stress test is negative for inducible myocardial ischemia or infarction.    The left ventricular ejection fraction is 66%.    Abnormal stress EKG    There is no prior study available.           Physical Examination  Review of Systems   /66 (BP Location: Right arm, Patient Position: Sitting, Cuff Size: Adult Regular)   Pulse 65   Resp 16   Ht 1.918 m (6' 3.5\")   Wt 79.8 kg (176 lb)   BMI 21.71 kg/m    Body mass index is 21.71 kg/m .  Wt Readings from Last 3 Encounters:   25 79.8 kg (176 lb)   25 77.1 kg (170 lb)   24 77.1 kg (170 lb)       General Appearance:   Pleasant gentleman appears stated age. no acute distress, normal body habitus   ENT/Mouth: Oropharynx normal    EYES:  no scleral icterus, normal conjunctivae. No eyelid xanthelasma   Neck: No cervical lymphadenopathy  Thyroid not enlarged or nodular  No JVD   Respiratory:   lungs clear , no rales or wheezing, Normal chest wall expansion    Cardiovascular:   Regular rhythm, normal rate. Normal 1st and 2nd heart sounds.  No murmurs, no rubs or gallops;   radial pulses are full and equal   Jugular venous pressure is not elevated   Abdomen/GI:  No " tenderness, no organomegaly, no pulsatile masses. NBS.   Extremities: No cyanosis or clubbing.  No peripheral edema   Skin: No rash or lesions   Heme/lymph/ Immunology No lymphadenopathy, petechiae   Neurologic: Alert and oriented. No focal motor weakness, gait appears normal.       Psychiatric: Pleasant, calm, appropriate affect.          No known hepatic, renal, pulmonary, or CNS disorders. The remainder of the complete ROS is negative except as noted above.         Medical History  Surgical History Family History Social History   Past Medical History:   Diagnosis Date    Motion sickness     Sleep apnea      Past Surgical History:   Procedure Laterality Date    APPENDECTOMY      ARTHROPLASTY KNEE Right 1/12/2024    Procedure: RIGHT TOTAL KNEE ARTHROPLASTY;  Surgeon: Jose Diamond MD;  Location: [facility]    BIOPSY SKIN (LOCATION)      COLON SURGERY      REMOVAL OF POLYPS    FINGER SURGERY Left     THUMB, POINTER AND MIDDLE FINGER    FRACTURE SURGERY      HERNIA REPAIR      KNEE SURGERY Bilateral     TO REPAIR CARTILATE    LAPAROTOMY EXPLORATORY N/A 02/19/2016    Procedure: EXPLORATORY LAPAROTOMY , LYSIS OF ADHESIONS, APPENDECTOMY;  Surgeon: Dalton Galicia MD;  Location: [facility];  Service:     ORTHOPEDIC SURGERY      OTHER SURGICAL HISTORY Right      BONE SPUR    SPINAL FUSION      C5-C6    TONSILLECTOMY      AND ADENOIDS    UVULOPALATOPHARYNGOPLASTY AND SEPTOPLASTY       Family History   Problem Relation Age of Onset    Dementia Mother     Heart Disease Mother     Hypertension Mother     Emphysema Father     Arthritis Father     Hypertension Sister     Arthritis Sister     Cancer Brother         Social History     Socioeconomic History    Marital status:      Spouse name: Not on file    Number of children: Not on file    Years of education: Not on file    Highest education level: Not on file   Occupational History    Not on file   Tobacco Use    Smoking status: Former      Current packs/day: 0.00     Average packs/day: 1 pack/day for 7.0 years (7.0 ttl pk-yrs)     Types: Cigarettes     Start date: 1971     Quit date: 1978     Years since quittin.3    Smokeless tobacco: Never   Substance and Sexual Activity    Alcohol use: Yes     Comment: Alcoholic Drinks/day: 3 drinks per week of wine, liquor or beer    Drug use: No    Sexual activity: Not on file   Other Topics Concern    Not on file   Social History Narrative    Patient arrived with a male family member 19       Social Drivers of Health     Financial Resource Strain: Not on file   Food Insecurity: No Food Insecurity (3/18/2024)    Received from [facility]    Hunger Vital Sign     Worried About Running Out of Food in the Last Year: Never true     Ran Out of Food in the Last Year: Never true   Transportation Needs: No Transportation Needs (3/18/2024)    Received from [facility]    PRAPARE - Transportation     Lack of Transportation (Medical): No     Lack of Transportation (Non-Medical): No   Physical Activity: Sufficiently Active (3/18/2024)    Received from [facility]    Exercise Vital Sign     Days of Exercise per Week: 5 days     Minutes of Exercise per Session: 30 min   Stress: Not on file   Social Connections: Not on file   Interpersonal Safety: Not on file   Housing Stability: Low Risk  (3/18/2024)    Received from [facility]    Housing Stability     What is your living situation today?: I have a steady place to live           Medications  Allergies   Current Outpatient Medications   Medication Sig Dispense Refill    apixaban ANTICOAGULANT (ELIQUIS ANTICOAGULANT) 5 MG tablet Take 1 tablet (5 mg) by mouth 2 times daily. 60 tablet 3    calcium carbonate 500 mg, elemental, (OSCAL 500) 1250 (500 Ca) MG TABS tablet Take 1 tablet by mouth daily      metoprolol succinate ER (TOPROL XL) 25 MG 24 hr tablet Take 1 tablet (25 mg) by mouth daily. 30 tablet 3    acetaminophen  "(TYLENOL) 325 MG tablet Take 2 tablets (650 mg) by mouth every 4 hours as needed for other (mild pain) 100 tablet 0    sildenafil (VIAGRA) 100 MG tablet Take 100 mg by mouth daily as needed         Allergies   Allergen Reactions    Wheat Extract GI Disturbance          Lab Results    Chemistry/lipid CBC Cardiac Enzymes/BNP/TSH/INR   Recent Labs   Lab Test 08/20/21  1538   CHOL 203*   HDL 70      TRIG 115     Recent Labs   Lab Test 08/20/21  1538 07/14/20  1031 11/26/19  1934    74 104     Recent Labs   Lab Test 04/11/25  1321      POTASSIUM 4.3   CHLORIDE 102   CO2 26   *   BUN 16.7   CR 0.87   GFRESTIMATED >90   SAMIR 10.2     Recent Labs   Lab Test 04/11/25  1321 01/05/24  0843 11/17/22  1502   CR 0.87 0.91 0.91     No results for input(s): \"A1C\" in the last 12617 hours.       Recent Labs   Lab Test 04/11/25  1321   WBC 7.5   HGB 16.1   HCT 47.4   MCV 93        Recent Labs   Lab Test 04/11/25  1321 03/28/18  1356   HGB 16.1 15.0    No results for input(s): \"TROPONINI\" in the last 42544 hours.  No results for input(s): \"BNP\", \"NTBNPI\", \"NTBNP\" in the last 15024 hours.  Recent Labs   Lab Test 04/11/25  1321   TSH 1.59     Recent Labs   Lab Test 04/11/25  1321   INR 0.95        Justo Mack MD                                     "

## 2025-04-14 NOTE — LETTER
"4/14/2025    Hardik Rouse MD  [address] [address]    RE: Tyrone Cohn       Dear Colleague,     I had the pleasure of seeing Tyrone Cohn in the [facility].    HEART CARE ENCOUNTER NOTE      [facility]  [phone]      Assessment/Recommendations   Assessment:   1.  Paroxysmal tachycardia episodes, probably atrial flutter with rapid response.  He is minimally symptomatic with these episodes.      Plan:   1.  Continue Eliquis 5 mg twice daily  2.  Stop metoprolol tartrate and begin metoprolol succinate 25 mg/day  3.  Will get a 14-day ZIO monitor to assess efficacy of metoprolol in suppressing his arrhythmia.  4.  Continue BiPAP titration per sleep apnea provider provider  5.  Reduce caffeine intake.    The longitudinal plan of care for the diagnosis(es)/condition(s) as documented were addressed during this visit. Due to the added complexity in care, I will continue to support Giovanni in the subsequent management and with ongoing continuity of care.         History of Present Illness/Subjective    HPI: Tyrone Cohn is a 75 year old male with a history of paroxysms of atrial fibrillation 20 years ago.  At that time he was found to have sleep apnea and was started on CPAP therapy.  After that he had no recurrence of any sensation of palpitations until recently when he began to have recurrent bursts of rapid heart rate which cause a sensation of \"wooziness\" or mild lightheadedness but no pre-syncope.  He denies any chest pain or shortness of breath.  He was seen in the emergency department 3 days ago.  Electrocardiogram showed narrow complex SVT at a rate of 150, possible atrial flutter with 2-1 conduction.  He was started on Eliquis and metoprolol tartrate once per day and referred to rapid access clinic for follow-up.  Since then he has not had recurrence of the tachycardia and feels well.  Has no current symptoms.  He has been having trouble getting his " "new CPAP device regulated and thinks this may have something to do with the recurrence of his arrhythmias.  He is hoping that once his device is reprogrammed that it will prevent recurrence.  He is quite active physically.  He is retired  who used to work for [employer].  He worked on [employer] primarily.  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  There is no history of ischemic heart disease.  He had a stress MPI about 5 years ago which was normal.  He is not a smoker and has no persistent hypertension diabetes or hyperlipidemia.  Family history is noncontributory.  He does not take any medications regularly prior to his recent ER visit.    Studies reviewed:  EC2025: ST/SVT rate 144, incomplete right bundle branch block, nonspecific ST-T wave changes.  Second ECG 2025: NSR rate 68, incomplete right bundle branch block otherwise normal  Stress MPI 2019:  The exercise nuclear stress test is negative for inducible myocardial ischemia or infarction.    The left ventricular ejection fraction is 66%.    Abnormal stress EKG    There is no prior study available.           Physical Examination  Review of Systems   /66 (BP Location: Right arm, Patient Position: Sitting, Cuff Size: Adult Regular)   Pulse 65   Resp 16   Ht 1.918 m (6' 3.5\")   Wt 79.8 kg (176 lb)   BMI 21.71 kg/m    Body mass index is 21.71 kg/m .  Wt Readings from Last 3 Encounters:   25 79.8 kg (176 lb)   25 77.1 kg (170 lb)   24 77.1 kg (170 lb)       General Appearance:   Pleasant gentleman appears stated age. no acute distress, normal body habitus   ENT/Mouth: Oropharynx normal    EYES:  no scleral icterus, normal conjunctivae. No eyelid xanthelasma   Neck: No cervical lymphadenopathy  Thyroid not enlarged or nodular  No JVD   Respiratory:   lungs clear , no rales or wheezing, Normal chest wall expansion  "   Cardiovascular:   Regular rhythm, normal rate. Normal 1st and 2nd heart sounds.  No murmurs, no rubs or gallops;   radial pulses are full and equal   Jugular venous pressure is not elevated   Abdomen/GI:  No tenderness, no organomegaly, no pulsatile masses. NBS.   Extremities: No cyanosis or clubbing.  No peripheral edema   Skin: No rash or lesions   Heme/lymph/ Immunology No lymphadenopathy, petechiae   Neurologic: Alert and oriented. No focal motor weakness, gait appears normal.       Psychiatric: Pleasant, calm, appropriate affect.          No known hepatic, renal, pulmonary, or CNS disorders. The remainder of the complete ROS is negative except as noted above.         Medical History  Surgical History Family History Social History   Past Medical History:   Diagnosis Date     Motion sickness      Sleep apnea      Past Surgical History:   Procedure Laterality Date     APPENDECTOMY       ARTHROPLASTY KNEE Right 1/12/2024    Procedure: RIGHT TOTAL KNEE ARTHROPLASTY;  Surgeon: Jose Diamond MD;  Location: [facility]     BIOPSY SKIN (LOCATION)       COLON SURGERY      REMOVAL OF POLYPS     FINGER SURGERY Left     THUMB, POINTER AND MIDDLE FINGER     FRACTURE SURGERY       HERNIA REPAIR       KNEE SURGERY Bilateral     TO REPAIR CARTILATE     LAPAROTOMY EXPLORATORY N/A 02/19/2016    Procedure: EXPLORATORY LAPAROTOMY , LYSIS OF ADHESIONS, APPENDECTOMY;  Surgeon: Dalton Galicia MD;  Location: [facility] OR;  Service:      ORTHOPEDIC SURGERY       OTHER SURGICAL HISTORY Right      BONE SPUR     SPINAL FUSION      C5-C6     TONSILLECTOMY      AND ADENOIDS     UVULOPALATOPHARYNGOPLASTY AND SEPTOPLASTY       Family History   Problem Relation Age of Onset     Dementia Mother      Heart Disease Mother      Hypertension Mother      Emphysema Father      Arthritis Father      Hypertension Sister      Arthritis Sister      Cancer Brother         Social History     Socioeconomic History     Marital  status:      Spouse name: Not on file     Number of children: Not on file     Years of education: Not on file     Highest education level: Not on file   Occupational History     Not on file   Tobacco Use     Smoking status: Former     Current packs/day: 0.00     Average packs/day: 1 pack/day for 7.0 years (7.0 ttl pk-yrs)     Types: Cigarettes     Start date: 1971     Quit date: 1978     Years since quittin.3     Smokeless tobacco: Never   Substance and Sexual Activity     Alcohol use: Yes     Comment: Alcoholic Drinks/day: 3 drinks per week of wine, liquor or beer     Drug use: No     Sexual activity: Not on file   Other Topics Concern     Not on file   Social History Narrative    Patient arrived with a male family member 19       Social Drivers of Health     Financial Resource Strain: Not on file   Food Insecurity: No Food Insecurity (3/18/2024)    Received from [facility]    Hunger Vital Sign      Worried About Running Out of Food in the Last Year: Never true      Ran Out of Food in the Last Year: Never true   Transportation Needs: No Transportation Needs (3/18/2024)    Received from [facility]    PRAPARE - Transportation      Lack of Transportation (Medical): No      Lack of Transportation (Non-Medical): No   Physical Activity: Sufficiently Active (3/18/2024)    Received from [facility]    Exercise Vital Sign      Days of Exercise per Week: 5 days      Minutes of Exercise per Session: 30 min   Stress: Not on file   Social Connections: Not on file   Interpersonal Safety: Not on file   Housing Stability: Low Risk  (3/18/2024)    Received from [facility]    Housing Stability      What is your living situation today?: I have a steady place to live           Medications  Allergies   Current Outpatient Medications   Medication Sig Dispense Refill     apixaban ANTICOAGULANT (ELIQUIS ANTICOAGULANT) 5 MG tablet Take 1 tablet (5 mg) by mouth 2 times  "daily. 60 tablet 3     calcium carbonate 500 mg, elemental, (OSCAL 500) 1250 (500 Ca) MG TABS tablet Take 1 tablet by mouth daily       metoprolol succinate ER (TOPROL XL) 25 MG 24 hr tablet Take 1 tablet (25 mg) by mouth daily. 30 tablet 3     acetaminophen (TYLENOL) 325 MG tablet Take 2 tablets (650 mg) by mouth every 4 hours as needed for other (mild pain) 100 tablet 0     sildenafil (VIAGRA) 100 MG tablet Take 100 mg by mouth daily as needed         Allergies   Allergen Reactions     Wheat Extract GI Disturbance          Lab Results    Chemistry/lipid CBC Cardiac Enzymes/BNP/TSH/INR   Recent Labs   Lab Test 08/20/21  1538   CHOL 203*   HDL 70      TRIG 115     Recent Labs   Lab Test 08/20/21  1538 07/14/20  1031 11/26/19  1934    74 104     Recent Labs   Lab Test 04/11/25  1321      POTASSIUM 4.3   CHLORIDE 102   CO2 26   *   BUN 16.7   CR 0.87   GFRESTIMATED >90   SAMIR 10.2     Recent Labs   Lab Test 04/11/25  1321 01/05/24  0843 11/17/22  1502   CR 0.87 0.91 0.91     No results for input(s): \"A1C\" in the last 90534 hours.       Recent Labs   Lab Test 04/11/25  1321   WBC 7.5   HGB 16.1   HCT 47.4   MCV 93        Recent Labs   Lab Test 04/11/25  1321 03/28/18  1356   HGB 16.1 15.0    No results for input(s): \"TROPONINI\" in the last 59194 hours.  No results for input(s): \"BNP\", \"NTBNPI\", \"NTBNP\" in the last 06629 hours.  Recent Labs   Lab Test 04/11/25  1321   TSH 1.59     Recent Labs   Lab Test 04/11/25  1321   INR 0.95        Justo Mack MD                                         Thank you for allowing me to participate in the care of your patient.      Sincerely,     Justo Mack MD     [facility]  cc:   Kelly Nance MD  [address] [place] [address]      "

## 2025-04-15 ENCOUNTER — ORDERS ONLY (AUTO-RELEASED) (OUTPATIENT)
Dept: CARDIOLOGY | Facility: CLINIC | Age: 75
End: 2025-04-15
Payer: COMMERCIAL

## 2025-04-15 DIAGNOSIS — I47.10 PAROXYSMAL SUPRAVENTRICULAR TACHYCARDIA: ICD-10-CM

## 2025-04-21 ENCOUNTER — LAB (OUTPATIENT)
Dept: LAB | Facility: CLINIC | Age: 75
End: 2025-04-21
Payer: COMMERCIAL

## 2025-04-21 ENCOUNTER — TELEPHONE (OUTPATIENT)
Dept: CARDIOLOGY | Facility: CLINIC | Age: 75
End: 2025-04-21

## 2025-04-21 DIAGNOSIS — R82.998 DARK URINE: ICD-10-CM

## 2025-04-21 DIAGNOSIS — R82.998 DARK URINE: Primary | ICD-10-CM

## 2025-04-21 LAB
ALBUMIN UR-MCNC: NEGATIVE MG/DL
APPEARANCE UR: CLEAR
BILIRUB UR QL STRIP: NEGATIVE
COLOR UR AUTO: YELLOW
GLUCOSE UR STRIP-MCNC: NEGATIVE MG/DL
HGB UR QL STRIP: NEGATIVE
KETONES UR STRIP-MCNC: NEGATIVE MG/DL
LEUKOCYTE ESTERASE UR QL STRIP: NEGATIVE
NITRATE UR QL: NEGATIVE
PH UR STRIP: 6.5 [PH] (ref 5–8)
SP GR UR STRIP: 1.02 (ref 1–1.03)
UROBILINOGEN UR STRIP-ACNC: 0.2 E.U./DL

## 2025-04-21 PROCEDURE — 81003 URINALYSIS AUTO W/O SCOPE: CPT

## 2025-04-21 NOTE — TELEPHONE ENCOUNTER
Phone call to patient - informed him of Dr. Mack's response / recommendations - patient reported that his urine is back to normal color after stopping Eliquis and agreed to go to  outpt lab for UA - order placed per protocol.  mg

## 2025-04-21 NOTE — TELEPHONE ENCOUNTER
----- Message from Justo Mack sent at 4/21/2025  7:48 AM CDT -----  Please call and ask if the urine color came back to normal after stopping the eliquis. He should not restart it until  we get a UA to confirm if there is blood in the urine. Justo Mack MD on 4/21/2025 at 7:50 AM  ----- Message -----  From: Maddison Mohr MD  Sent: 4/19/2025  10:31 AM CDT  To: MD Cb Willoughby Thsi aotient called me today with complains of his urine turning darker in color since he started Eliquis. I asked him to hold Eliquis and get back to you on Monday.

## 2025-04-22 ENCOUNTER — TELEPHONE (OUTPATIENT)
Dept: CARDIOLOGY | Facility: CLINIC | Age: 75
End: 2025-04-22
Payer: COMMERCIAL

## 2025-04-22 NOTE — TELEPHONE ENCOUNTER
[facility] Call Center    Phone Message    May a detailed message be left on voicemail: yes     Reason for Call: Other: pt was calling to see if provider wanted to do any medication changes based of his urine results. Pt is jumping on a plane at 1pm to head out of town and would need any medication changes sent to pharmacy for  prior to that time. Please call pt back to discuss.      Action Taken: Other: cardiology     Travel Screening: Not Applicable      Thank you!  [facility]        Date of Service:

## 2025-05-22 LAB — CV ZIO PRELIM RESULTS: NORMAL

## 2025-05-23 ENCOUNTER — RESULTS FOLLOW-UP (OUTPATIENT)
Dept: CARDIOLOGY | Facility: CLINIC | Age: 75
End: 2025-05-23
Payer: COMMERCIAL

## 2025-05-23 DIAGNOSIS — I48.92 ATRIAL FLUTTER (H): ICD-10-CM

## 2025-05-23 DIAGNOSIS — I47.10 PAROXYSMAL SUPRAVENTRICULAR TACHYCARDIA: Primary | ICD-10-CM

## 2025-06-17 ENCOUNTER — PREP FOR PROCEDURE (OUTPATIENT)
Dept: CARDIOLOGY | Facility: CLINIC | Age: 75
End: 2025-06-17
Payer: COMMERCIAL

## 2025-06-17 ENCOUNTER — DOCUMENTATION ONLY (OUTPATIENT)
Dept: CARDIOLOGY | Facility: CLINIC | Age: 75
End: 2025-06-17
Payer: COMMERCIAL

## 2025-06-17 ENCOUNTER — HOSPITAL ENCOUNTER (OUTPATIENT)
Facility: HOSPITAL | Age: 75
End: 2025-06-17
Attending: INTERNAL MEDICINE | Admitting: INTERNAL MEDICINE
Payer: COMMERCIAL

## 2025-06-17 DIAGNOSIS — I48.3 TYPICAL ATRIAL FLUTTER (H): ICD-10-CM

## 2025-06-17 DIAGNOSIS — I48.3 TYPICAL ATRIAL FLUTTER (H): Primary | ICD-10-CM

## 2025-06-17 RX ORDER — LIDOCAINE 40 MG/G
CREAM TOPICAL
OUTPATIENT
Start: 2025-06-17

## 2025-06-17 RX ORDER — SODIUM CHLORIDE 9 MG/ML
100 INJECTION, SOLUTION INTRAVENOUS CONTINUOUS
OUTPATIENT
Start: 2025-06-17

## 2025-06-17 NOTE — PROGRESS NOTES
AC: Eliquis-   Continue AAD: Metoprolol-Hold 3 days  Diuretics: None  DM Meds: None  GLP-1:None  SGLT2 Inhibitors: None  ED Meds: Sildenafil (Viagra)     Maddison Mohr MD  [facility] Ep Support [facility]  Dear team,       Please schedule Giovanni for an atrial flutter ablation if he is agreeable to it. Do call him next week to check.      MAC  CARTO mapping system with RF  Hold Metoprolol 3 days prior to ablation  Continue anticoagulation  CBC, BMP  on day of procedure    Thanks  VAHID Cohn, 1950, [id number]  Home:[phone] (home) Cell:[phone] (mobile)  Emergency Contact: Danny Cohn   PCP: Hardik Rouse, [phone]    Important patient information for CSC/Cath Lab staff : None    [facility] EP Cath Lab Procedure Order   Ablation Type:Atrial Flutter  Ordering Provider: Dr Mohr  Date Ordered and Prepped: 6/17/2025 Freda Christine RN  Anticipated Case Duration:  Standard ( Case per day SA 2:1, DW 4:1, VAHID 3:1)   Scheduling Timeframe:  Next Available  Scheduling Restrictions: None  Scheduling Contact: Please contact pt to schedule, if you are unable to schedule date within the next 24 hours please contact pt to update on scheduling process  EP RN Follow Up Apt: Dr Souza or Dr Alanis Ablations, do NOT need RN PC follow-up post procedure. Dr Mohr's PVC/VT ablations need 3-4 day EP RN PC post procedure.  Current Device/Device Co Needed for Procedure: None NoneNone  Pre-Procedural Testing needed: None  Mapping System Required:  Carto (Onur Alanis)  ICE Needed:  Yes Where is the patient located?  Anesthesia:  MAC- Monitored Anesthesia Care    [facility] EP Cath Lab Prep   H&P:  Compled by cardiology on 6/13/25 if scheduled within 30 days, pt to schedule with PMD if procedure outside of this timeframe  Pre-Procedure Labs/T&S: For VT & PVC Ablations only schedule lab visit at [facility] lab within 3 days prior to procedure for T&S, BMP, CBC, HcG is appropriate, and INR if on warfarin. All  other ablations pre-procedure lab work will be done the morning of the procedure.  Medical Records Pertinent for Procedure:  None  Iodinated Contrast Dye Allergies (Does not include Shellfish, Egg, and/or Iodine Allergy): None  GLP-1 Protocol: If on Dulaglutide (Trulicity) (weekly)- Injection hold 7 days prior to procedure  , Exenatide extended release (Bydureon bcise) (weekly)- Injection hold 7 days prior to procedure, Exenatide (Byetta) (twice daily)- Oral Tablet hold day prior and morning of procedure and for Injection hold 7 days prior to procedure, Semaglutide (Ozempic) (weekly)- Injection and Oral hold 7 days prior to procedure, Liraglutide (Victoza, Saxenda) (daily)- Injection hold day prior and morning of procedure  SGLT2 Inhibitors Protocol: ertugliflozin (Steglatro)- Hold 4 days prior to procedure for risk of ketoacidosis. canagliflozin (Invokana), dapagliflozin (Farxiga), and empagliflozin (Jardiance)- Hold 3 days prior to procedure for risk of ketoacidosis. If pt taking for pulmonary HTN, continue.   ED Meds Protocol:  If taking Sildenafil (Viagra), Tadalafill (Cialis, Adcirca), or Vardnafil (Levitra, Staxyn)- Hold 3 days prior to procedure  Follow Up S/P: AVN-General Card @ 6mo + if EF <50 or dx of HF HF JOVANNA at time of device PO or if EF >50% no dx of HF EP JOVANNA at time of device PO, AFL and SVT Ablation EP JOVANNA apt at 6 wk (CV lab  to schedule at time of case scheduling), VT Ablation 3-4 day EP RN PC Visit & EP MD apt at 6 wk (CV lab  to schedule at time of case scheduling), PVC Ablation 3-4 day EP RN PC Visit & 3 day Zio at 4 wks for KA and 1 wk ZIO at 4 wks for DW with EP MD apt at 8 wks (CV lab  to schedule at time of case scheduling).    Allergies   Allergen Reactions    Wheat Extract GI Disturbance    Pollen Extract Other (See Comments)     Runny nose       Current Outpatient Medications:     acetaminophen (TYLENOL) 325 MG tablet, Take 2 tablets (650 mg) by mouth every 4  hours as needed for other (mild pain), Disp: 100 tablet, Rfl: 0    apixaban ANTICOAGULANT (ELIQUIS ANTICOAGULANT) 5 MG tablet, Take 1 tablet (5 mg) by mouth 2 times daily., Disp: 60 tablet, Rfl: 3    calcium carbonate 500 mg, elemental, (OSCAL 500) 1250 (500 Ca) MG TABS tablet, Take 1 tablet by mouth daily, Disp: , Rfl:     metoprolol succinate ER (TOPROL XL) 25 MG 24 hr tablet, Take 1 tablet (25 mg) by mouth daily., Disp: 30 tablet, Rfl: 3    sildenafil (VIAGRA) 100 MG tablet, Take 100 mg by mouth daily as needed, Disp: , Rfl:     Documentation Date:6/17/2025 8:24 AM  Freda Christine RN

## 2025-07-08 ENCOUNTER — TELEPHONE (OUTPATIENT)
Dept: CARDIOLOGY | Facility: CLINIC | Age: 75
End: 2025-07-08
Payer: COMMERCIAL

## 2025-07-08 NOTE — TELEPHONE ENCOUNTER
Pre-Procedure Education    Procedure: AFL Abaltion with Dr Mohr on 7/17 with arrival time 7:30 am    Orders: Orderset for procedure verified signed/held    COVID: COVID policy- if pt develops COVID like symptoms prior to procedure, he/she would need to complete an at home with a rapid antigen COVID test 1-2 days prior to your procedure date. If COVID + pt is aware the procedure will need to be rescheduled, and to contact CV scheduling as soon as possible    Pre-Op H&P: Will need to verify that pt has pre-op scheduled with PMD, as apt/record of pre-op not listed in EPIC- Will request upon completion    Education:   Contact: Attempted to contact pt via phone, VM was left with request that pt return call to review above/below information  Pre-Procedure Instruction: NPO after midnight pre procedure, Defined NPO with pt, Remove all jewelry and leave all valuables at home, Shower prior to arrival, Sedation plan/orders, Transportation requirements and arrangements post procedure, Post-procedure follow up process, Post-procedure restrictions/expectations, and Pre-procedure letter sent- letter tab  Risks:  Cardiac Ablation  <1% Hypotension, Hemorrhage, Thrombophlebitis, Systemic or pulmonic emboli, Cardiac perforation (tamponade), Infection, Pneumothorax, Arrhythmias, Proarrhythmic effects of drugs, Radiation exposure, Catheter entrapment  <1 % Vascular injury including perforation of vein, artery or heart  1-2% Complete heart block (for AVNRT or septal accessory pathway)  <0.5% CVA or MI, 1% CVA if Left sided ablation  <0.1% death  If external defibrillation or CV is needed, 25% risk for superficial burn  1-2% Tamponade and Aortic puncture with left sided transeptal approach  Risks associated with general anesthesia will be addressed by the Anesthesiology Department      Medication:   Instructions regarding anticoagulants: Eliquis- To continue anticoagulation uninterrupted through their procedure  Instructions  regarding antiarrhythmic medication: Beta Blocker; Hold 3 days prior to procedure  Instructions given to pt regarding diuretics medication: None  Instructions given to pt regarding Diabetic medications:   DM- None  GLP-1- None  SGLT2- None   Instructions given to pt regarding ED medication: Hold all ED medications 3 days prior to procedure  Instructions for medication, other than anticoagulants and antiarrhythmics listed above, given to pt: Take all medication AM of procedure with small sips of water     Important patient information for staff: None    7/8/2025 3:12 PM  Steff Bruce RN

## 2025-07-09 ENCOUNTER — TELEPHONE (OUTPATIENT)
Dept: CARDIOLOGY | Facility: CLINIC | Age: 75
End: 2025-07-09
Payer: COMMERCIAL

## 2025-07-09 ENCOUNTER — NURSE TRIAGE (OUTPATIENT)
Dept: CARDIOLOGY | Facility: CLINIC | Age: 75
End: 2025-07-09
Payer: COMMERCIAL

## 2025-07-09 NOTE — TELEPHONE ENCOUNTER
2nd Attempt to contact pt. SOUMYA with direct number [phone] requesting return call.     Freda Christine RN

## 2025-07-09 NOTE — TELEPHONE ENCOUNTER
[facility] Call Center    Phone Message    May a detailed message be left on voicemail: yes     Reason for Call: Other: Pt stated paperwork adv him to arrive  at 7:30am regarding  upcoming  procedure,  and  he has  a conflict that day and  needs procedure to possibly be moved .       Action Taken: Other: cardio     Travel Screening: Not Applicable     Date of Service:     Thank you!  [facility]

## 2025-07-09 NOTE — TELEPHONE ENCOUNTER
Incoming call from patient, education completed. Pre-op Monday with [facility]. Will request records on completion, otherwise patient was given fax for records to be sent to. [facility]

## 2025-07-09 NOTE — TELEPHONE ENCOUNTER
"Patient calling clinic reporting a new pocket of blood that has formed in his ankle over the last 4 days. Patient stated the spot is tender to the touch but does not hurt. Patient also denied any recent injury and stated the spot grows to the size of \"a pack of cigarettes\".     RN advised patient to be seen in UC or ED for evaluation of symptoms. Patient refused and stated he will contact his PCP to see if he can get in for an appt.     Routing to team as update.   "

## 2025-07-14 ENCOUNTER — HOSPITAL ENCOUNTER (OUTPATIENT)
Facility: HOSPITAL | Age: 75
End: 2025-07-14
Attending: INTERNAL MEDICINE | Admitting: INTERNAL MEDICINE
Payer: COMMERCIAL

## 2025-07-14 DIAGNOSIS — I48.3 TYPICAL ATRIAL FLUTTER (H): ICD-10-CM

## 2025-07-21 ENCOUNTER — LAB REQUISITION (OUTPATIENT)
Dept: LAB | Facility: CLINIC | Age: 75
End: 2025-07-21
Payer: COMMERCIAL

## 2025-07-21 DIAGNOSIS — M25.561 PAIN IN RIGHT KNEE: ICD-10-CM

## 2025-07-21 LAB
CRP SERPL-MCNC: <3 MG/L
ERYTHROCYTE [SEDIMENTATION RATE] IN BLOOD BY WESTERGREN METHOD: 4 MM/HR (ref 0–20)

## 2025-07-21 PROCEDURE — 85652 RBC SED RATE AUTOMATED: CPT | Mod: ORL | Performed by: FAMILY MEDICINE

## 2025-07-21 PROCEDURE — 86140 C-REACTIVE PROTEIN: CPT | Mod: ORL | Performed by: FAMILY MEDICINE

## 2025-07-23 ENCOUNTER — TELEPHONE (OUTPATIENT)
Dept: AUDIOLOGY | Facility: CLINIC | Age: 75
End: 2025-07-23
Payer: COMMERCIAL

## 2025-07-23 NOTE — TELEPHONE ENCOUNTER
[facility] Call Center    Phone Message    May a detailed message be left on voicemail: yes     Reason for Call: Symptoms or Concerns     If patient has red-flag symptoms, warm transfer to triage line    Current symptom or concern: Rubber seal may be stuck in right ear.     Symptoms have been present for:  1 day(s)    Has patient previously been seen for this? No    By : Zara Rothman    Date: 07/18/2025    Are there any new or worsening symptoms? Yes: Patient woke up this morning to but his hearing aids in and the right side is missing the rubber piece. Patient believes it may have fallen out in his ear cannel. It feels like there is something in his right ear. Patient want to know of we can squeeze him in today to have his ear looked at.    Please call patient back at [phone]. Thank you.    Action Taken: Message routed to:  [facility] ([facility] Aud     Travel Screening: Not Applicable

## 2025-07-24 ENCOUNTER — OFFICE VISIT (OUTPATIENT)
Dept: AUDIOLOGY | Facility: CLINIC | Age: 75
End: 2025-07-24
Payer: COMMERCIAL

## 2025-07-24 ENCOUNTER — OFFICE VISIT (OUTPATIENT)
Dept: OTOLARYNGOLOGY | Facility: CLINIC | Age: 75
End: 2025-07-24
Payer: COMMERCIAL

## 2025-07-24 VITALS
SYSTOLIC BLOOD PRESSURE: 122 MMHG | TEMPERATURE: 97.6 F | WEIGHT: 171 LBS | HEART RATE: 51 BPM | DIASTOLIC BLOOD PRESSURE: 53 MMHG | HEIGHT: 75 IN | OXYGEN SATURATION: 97 % | BODY MASS INDEX: 21.26 KG/M2

## 2025-07-24 DIAGNOSIS — H90.3 SENSORINEURAL HEARING LOSS, BILATERAL: Primary | ICD-10-CM

## 2025-07-24 ASSESSMENT — PAIN SCALES - GENERAL: PAINLEVEL_OUTOF10: NO PAIN (0)

## 2025-07-24 NOTE — Clinical Note
7/24/2025       RE: Tyrone Cohn  [address]  [facility] [place] [address]     Dear Colleague,    Thank you for referring your patient, Tyrone Cohn, to the [facility] [place] [facility]. Please see a copy of my visit note below.    Tyrone Lucas is 75-year-old male who presented for routine audiology appointment today.    Audiology evaluated patient and noted to have a foreign object in the right ear canal.  Patient reported losing piece of hearing aid dome in the right ear.    Procedure:  Time Out was performed with confirmation of the patient, site and procedure.   On microscopic evaluation, a piece of the hearing aid dome was noted to be encircled in wax and attached to the anterior external auditory canal. This was removed from the ear canal using a right angle pick. The hearing aid dome and cerumen complex detached nicely and was extracted using alligator forceps. This concluded the procedure. Pt tolerated the procedure well.    Marcio Parra MD   ENT Resident     I, Anahy Estrella MD, was immediately available for the entire procedure.        Again, thank you for allowing me to participate in the care of your patient.      Sincerely,    Anahy Estrella MD

## 2025-07-24 NOTE — PATIENT INSTRUCTIONS
You were seen in the ENT Clinic today by Dr. Estrella. If you have any questions or concerns after your appointment, please contact us (see below)        Please return to clinic as needed    How to Contact Us:  Send a [facility] message to your provider. Our team will respond to you via [facility]. Occasionally, we will need to call you to get further information.  For urgent matters (Monday-Friday), call the ENT Clinic: [phone] and speak with a call center team member - they will route your call appropriately.   If you'd like to speak directly with a nurse, please find our contact information below. We do our best to check voicemail frequently throughout the day, and will work to call you back within 1-2 days. For urgent matters, please use the general clinic phone numbers listed above.    Whitney PACHECO, RN, BSN  RN Care Coordinator, ENT Clinic  [facility]  Direct: [phone]  Indira DAVID LPN  Direct: [phone]

## 2025-07-24 NOTE — PROGRESS NOTES
AUDIOLOGY REPORT    SUMMARY: Patient arrived stating that a piece of the dome from his hearing aid is in his right ear. Otoscopy revealed a flat portion of a tulip dome and cerumen near the right tympanic membrane (TM). Did not attempt to remove the dome due to the depth and proximity to the TM. Left canal is clear.    RECOMMENDATIONS: The patient is released to ENT to have the dome removed safely.       I was present with the patient for the entire audiology appointment including all procedures/testing performed by the AuD student, and agree with the student's assessment and plan as documented.       Storm Ram, CCC-A  Clinical Audiologist  [place] #[id number]

## 2025-07-24 NOTE — NURSING NOTE
"Chief Complaint   Patient presents with    RECHECK     Needs Hearing aid piece  lodged in right ear      Blood pressure 122/53, pulse 51, temperature 97.6  F (36.4  C), height 1.905 m (6' 3\"), weight 77.6 kg (171 lb), SpO2 97%.  Jett Sparks LPN    "

## 2025-07-24 NOTE — PROGRESS NOTES
Tyrone Lucas is 75-year-old male who presented for routine cardiology appointment today.    Audiology evaluated patient and noted to have a foreign object in the right ear canal.  Patient reported losing piece of hearing aid dome in the right ear.    On ENT evaluation, a piece of the hearing aid dome was noted to be encircled in wax and attached to the anterior external auditory canal.    This was removed from the ear canal using a right angle pick. The hearing aid dome and cerumen complex detached nicely and was extracted using alligator forceps. This concluded the procedure. Pt tolerated the procedure well.    Marcio Parra MD   ENT Resident

## (undated) DEVICE — SUCTION MANIFOLD NEPTUNE 2 SYS 4 PORT 0702-020-000

## (undated) DEVICE — CAST PADDING 6" STERILE 9046S

## (undated) DEVICE — GLOVE BIOGEL PI ULTRATOUCH G SZ 8.0 42180

## (undated) DEVICE — SUTURE VICRYL+ 1 27IN CT-1 UND VCP261H

## (undated) DEVICE — BLADE SAW SAGITTAL STRK DUAL CUT 4118-135-090

## (undated) DEVICE — ELECTRODE PATIENT RETURN ADULT L10 FT 2 PLATE CORD 0855C

## (undated) DEVICE — SU STRATAFIX PDS PLUS 1 CT-1 18" SXPP1A404

## (undated) DEVICE — SU STRATAFIX PDS PLUS 2-0 SPIRAL CT-1 30CM SXPP1B410

## (undated) DEVICE — A3 SUPPLIES- SEE NURSING INFO PAGE

## (undated) DEVICE — SOL WATER IRRIG 1000ML BOTTLE 2F7114

## (undated) DEVICE — DRAPE SHEET REV FOLD 3/4 9349

## (undated) DEVICE — GLOVE UNDER INDICATOR PI SZ 8.5 LF 41685

## (undated) DEVICE — DECANTER VIAL 2006S

## (undated) DEVICE — SOL NACL 0.9% IRRIG 1000ML BOTTLE 2F7124

## (undated) DEVICE — GLOVE BIOGEL PI ULTRATOUCH G SZ 8.5 42185

## (undated) DEVICE — SUTURE VICRYL+ 2 27IN CP UNDYED VCP195H

## (undated) DEVICE — HOLDER LIMB VELCRO OR 0814-1533

## (undated) DEVICE — SUTURE MONOCRYL 3-0 18 PS2 UND MCP497G

## (undated) DEVICE — GLOVE BIOGEL PI INDICATOR 8.0 LF 41680

## (undated) DEVICE — CUSTOM PACK TOTAL KNEE ACCESSORY SOP5BTAHEA

## (undated) DEVICE — CUSTOM PACK TOTAL KNEE SOP5BTKHEC

## (undated) DEVICE — SOL NACL 0.9% INJ 1000ML BAG 2B1324X

## (undated) RX ORDER — ONDANSETRON 2 MG/ML
INJECTION INTRAMUSCULAR; INTRAVENOUS
Status: DISPENSED
Start: 2024-01-12

## (undated) RX ORDER — LIDOCAINE HYDROCHLORIDE 10 MG/ML
INJECTION, SOLUTION EPIDURAL; INFILTRATION; INTRACAUDAL; PERINEURAL
Status: DISPENSED
Start: 2024-01-12

## (undated) RX ORDER — BUPIVACAINE HYDROCHLORIDE 5 MG/ML
INJECTION, SOLUTION EPIDURAL; INTRACAUDAL
Status: DISPENSED
Start: 2024-01-12

## (undated) RX ORDER — PROPOFOL 10 MG/ML
INJECTION, EMULSION INTRAVENOUS
Status: DISPENSED
Start: 2024-01-12

## (undated) RX ORDER — DEXAMETHASONE SODIUM PHOSPHATE 4 MG/ML
INJECTION, SOLUTION INTRA-ARTICULAR; INTRALESIONAL; INTRAMUSCULAR; INTRAVENOUS; SOFT TISSUE
Status: DISPENSED
Start: 2024-01-12